# Patient Record
Sex: FEMALE | Race: WHITE | NOT HISPANIC OR LATINO | URBAN - NONMETROPOLITAN AREA
[De-identification: names, ages, dates, MRNs, and addresses within clinical notes are randomized per-mention and may not be internally consistent; named-entity substitution may affect disease eponyms.]

---

## 2022-02-18 ENCOUNTER — HOSPITAL ENCOUNTER (OUTPATIENT)
Facility: HOSPITAL | Age: 67
Discharge: NURSING, REHAB OR LONG-TERM CARE | End: 2022-03-09
Attending: INTERNAL MEDICINE | Admitting: INTERNAL MEDICINE

## 2022-02-18 ENCOUNTER — APPOINTMENT (OUTPATIENT)
Dept: GENERAL RADIOLOGY | Facility: HOSPITAL | Age: 67
End: 2022-02-18

## 2022-02-18 LAB — GLUCOSE BLDC GLUCOMTR-MCNC: 111 MG/DL (ref 70–130)

## 2022-02-18 PROCEDURE — 87102 FUNGUS ISOLATION CULTURE: CPT | Performed by: INTERNAL MEDICINE

## 2022-02-18 PROCEDURE — 25010000002 HEPARIN (PORCINE) PER 1000 UNITS: Performed by: INTERNAL MEDICINE

## 2022-02-18 PROCEDURE — 71045 X-RAY EXAM CHEST 1 VIEW: CPT

## 2022-02-18 PROCEDURE — 82962 GLUCOSE BLOOD TEST: CPT

## 2022-02-18 PROCEDURE — 99221 1ST HOSP IP/OBS SF/LOW 40: CPT | Performed by: INTERNAL MEDICINE

## 2022-02-18 RX ORDER — HEPARIN SODIUM 5000 [USP'U]/ML
5000 INJECTION, SOLUTION INTRAVENOUS; SUBCUTANEOUS EVERY 12 HOURS SCHEDULED
Status: DISCONTINUED | OUTPATIENT
Start: 2022-02-18 | End: 2022-03-10 | Stop reason: HOSPADM

## 2022-02-18 RX ORDER — IPRATROPIUM BROMIDE AND ALBUTEROL SULFATE 2.5; .5 MG/3ML; MG/3ML
3 SOLUTION RESPIRATORY (INHALATION)
Status: DISCONTINUED | OUTPATIENT
Start: 2022-02-18 | End: 2022-02-23

## 2022-02-18 RX ORDER — ONDANSETRON 4 MG/1
4 TABLET, FILM COATED ORAL EVERY 6 HOURS PRN
Status: DISCONTINUED | OUTPATIENT
Start: 2022-02-18 | End: 2022-03-10 | Stop reason: HOSPADM

## 2022-02-18 RX ORDER — ACETAMINOPHEN 650 MG/1
650 SUPPOSITORY RECTAL EVERY 4 HOURS PRN
Status: DISCONTINUED | OUTPATIENT
Start: 2022-02-18 | End: 2022-03-10 | Stop reason: HOSPADM

## 2022-02-18 RX ORDER — ACETAMINOPHEN 325 MG/1
650 TABLET ORAL EVERY 4 HOURS PRN
Status: DISCONTINUED | OUTPATIENT
Start: 2022-02-18 | End: 2022-03-10 | Stop reason: HOSPADM

## 2022-02-18 RX ORDER — SODIUM CHLORIDE 0.9 % (FLUSH) 0.9 %
10 SYRINGE (ML) INJECTION EVERY 12 HOURS SCHEDULED
Status: DISCONTINUED | OUTPATIENT
Start: 2022-02-18 | End: 2022-03-10 | Stop reason: HOSPADM

## 2022-02-18 RX ORDER — AMLODIPINE BESYLATE 10 MG/1
10 TABLET ORAL
Status: DISCONTINUED | OUTPATIENT
Start: 2022-02-19 | End: 2022-03-10 | Stop reason: HOSPADM

## 2022-02-18 RX ORDER — POLYETHYLENE GLYCOL 3350 17 G/17G
17 POWDER, FOR SOLUTION ORAL DAILY
Status: DISCONTINUED | OUTPATIENT
Start: 2022-02-19 | End: 2022-03-10 | Stop reason: HOSPADM

## 2022-02-18 RX ORDER — SODIUM CHLORIDE 0.9 % (FLUSH) 0.9 %
10 SYRINGE (ML) INJECTION AS NEEDED
Status: DISCONTINUED | OUTPATIENT
Start: 2022-02-18 | End: 2022-03-10 | Stop reason: HOSPADM

## 2022-02-18 RX ORDER — DEXTROSE MONOHYDRATE 25 G/50ML
25 INJECTION, SOLUTION INTRAVENOUS
Status: DISCONTINUED | OUTPATIENT
Start: 2022-02-18 | End: 2022-03-10 | Stop reason: HOSPADM

## 2022-02-18 RX ORDER — HYDRALAZINE HYDROCHLORIDE 25 MG/1
50 TABLET, FILM COATED ORAL EVERY 12 HOURS SCHEDULED
Status: DISCONTINUED | OUTPATIENT
Start: 2022-02-18 | End: 2022-03-10 | Stop reason: HOSPADM

## 2022-02-18 RX ORDER — LEVETIRACETAM 100 MG/ML
500 SOLUTION ORAL EVERY 12 HOURS SCHEDULED
Status: DISCONTINUED | OUTPATIENT
Start: 2022-02-18 | End: 2022-03-10 | Stop reason: HOSPADM

## 2022-02-18 RX ORDER — ATORVASTATIN CALCIUM 40 MG/1
80 TABLET, FILM COATED ORAL NIGHTLY
Status: DISCONTINUED | OUTPATIENT
Start: 2022-02-18 | End: 2022-03-10 | Stop reason: HOSPADM

## 2022-02-18 RX ORDER — ONDANSETRON 2 MG/ML
4 INJECTION INTRAMUSCULAR; INTRAVENOUS EVERY 6 HOURS PRN
Status: DISCONTINUED | OUTPATIENT
Start: 2022-02-18 | End: 2022-03-10 | Stop reason: HOSPADM

## 2022-02-18 RX ORDER — NICOTINE POLACRILEX 4 MG
15 LOZENGE BUCCAL
Status: DISCONTINUED | OUTPATIENT
Start: 2022-02-18 | End: 2022-03-10 | Stop reason: HOSPADM

## 2022-02-18 RX ORDER — LANSOPRAZOLE
30 KIT DAILY
Status: DISCONTINUED | OUTPATIENT
Start: 2022-02-19 | End: 2022-03-10 | Stop reason: HOSPADM

## 2022-02-19 PROBLEM — J96.01 ACUTE RESPIRATORY FAILURE WITH HYPOXIA: Status: ACTIVE | Noted: 2022-02-19

## 2022-02-19 PROBLEM — Z43.0 ACUTE TRACHEOSTOMY MANAGEMENT: Status: ACTIVE | Noted: 2022-02-19

## 2022-02-19 PROBLEM — G81.94 LEFT HEMIPARESIS: Status: ACTIVE | Noted: 2022-02-19

## 2022-02-19 LAB
ALBUMIN SERPL-MCNC: 4.8 G/DL (ref 3.5–5.2)
ALBUMIN/GLOB SERPL: 1.8 G/DL
ALP SERPL-CCNC: 65 U/L (ref 39–117)
ALT SERPL W P-5'-P-CCNC: 24 U/L (ref 1–33)
ANION GAP SERPL CALCULATED.3IONS-SCNC: 14 MMOL/L (ref 5–15)
AST SERPL-CCNC: 22 U/L (ref 1–32)
BASOPHILS # BLD AUTO: 0.06 10*3/MM3 (ref 0–0.2)
BASOPHILS NFR BLD AUTO: 0.6 % (ref 0–1.5)
BILIRUB SERPL-MCNC: 0.8 MG/DL (ref 0–1.2)
BUN SERPL-MCNC: 31 MG/DL (ref 8–23)
BUN/CREAT SERPL: 56.4 (ref 7–25)
CALCIUM SPEC-SCNC: 11.1 MG/DL (ref 8.6–10.5)
CHLORIDE SERPL-SCNC: 107 MMOL/L (ref 98–107)
CO2 SERPL-SCNC: 24 MMOL/L (ref 22–29)
CREAT SERPL-MCNC: 0.55 MG/DL (ref 0.57–1)
DEPRECATED RDW RBC AUTO: 62.1 FL (ref 37–54)
EOSINOPHIL # BLD AUTO: 0.49 10*3/MM3 (ref 0–0.4)
EOSINOPHIL NFR BLD AUTO: 4.8 % (ref 0.3–6.2)
ERYTHROCYTE [DISTWIDTH] IN BLOOD BY AUTOMATED COUNT: 19 % (ref 12.3–15.4)
GFR SERPL CREATININE-BSD FRML MDRD: 110 ML/MIN/1.73
GLOBULIN UR ELPH-MCNC: 2.6 GM/DL
GLUCOSE BLDC GLUCOMTR-MCNC: 105 MG/DL (ref 70–130)
GLUCOSE BLDC GLUCOMTR-MCNC: 114 MG/DL (ref 70–130)
GLUCOSE BLDC GLUCOMTR-MCNC: 117 MG/DL (ref 70–130)
GLUCOSE BLDC GLUCOMTR-MCNC: 118 MG/DL (ref 70–130)
GLUCOSE BLDC GLUCOMTR-MCNC: 125 MG/DL (ref 70–130)
GLUCOSE SERPL-MCNC: 126 MG/DL (ref 65–99)
HCT VFR BLD AUTO: 30.8 % (ref 34–46.6)
HGB BLD-MCNC: 9.2 G/DL (ref 12–15.9)
IMM GRANULOCYTES # BLD AUTO: 0.09 10*3/MM3 (ref 0–0.05)
IMM GRANULOCYTES NFR BLD AUTO: 0.9 % (ref 0–0.5)
LYMPHOCYTES # BLD AUTO: 1.84 10*3/MM3 (ref 0.7–3.1)
LYMPHOCYTES NFR BLD AUTO: 17.9 % (ref 19.6–45.3)
MCH RBC QN AUTO: 26.8 PG (ref 26.6–33)
MCHC RBC AUTO-ENTMCNC: 29.9 G/DL (ref 31.5–35.7)
MCV RBC AUTO: 89.8 FL (ref 79–97)
MONOCYTES # BLD AUTO: 0.78 10*3/MM3 (ref 0.1–0.9)
MONOCYTES NFR BLD AUTO: 7.6 % (ref 5–12)
NEUTROPHILS NFR BLD AUTO: 68.2 % (ref 42.7–76)
NEUTROPHILS NFR BLD AUTO: 7.04 10*3/MM3 (ref 1.7–7)
NRBC BLD AUTO-RTO: 0 /100 WBC (ref 0–0.2)
PLATELET # BLD AUTO: 428 10*3/MM3 (ref 140–450)
PMV BLD AUTO: 11.5 FL (ref 6–12)
POTASSIUM SERPL-SCNC: 3.8 MMOL/L (ref 3.5–5.2)
PREALB SERPL-MCNC: 17.7 MG/DL (ref 20–40)
PROT SERPL-MCNC: 7.4 G/DL (ref 6–8.5)
RBC # BLD AUTO: 3.43 10*6/MM3 (ref 3.77–5.28)
SODIUM SERPL-SCNC: 145 MMOL/L (ref 136–145)
WBC NRBC COR # BLD: 10.3 10*3/MM3 (ref 3.4–10.8)

## 2022-02-19 PROCEDURE — 97165 OT EVAL LOW COMPLEX 30 MIN: CPT

## 2022-02-19 PROCEDURE — 80053 COMPREHEN METABOLIC PANEL: CPT | Performed by: INTERNAL MEDICINE

## 2022-02-19 PROCEDURE — 25010000002 HEPARIN (PORCINE) PER 1000 UNITS: Performed by: INTERNAL MEDICINE

## 2022-02-19 PROCEDURE — 82962 GLUCOSE BLOOD TEST: CPT

## 2022-02-19 PROCEDURE — 84134 ASSAY OF PREALBUMIN: CPT | Performed by: INTERNAL MEDICINE

## 2022-02-19 PROCEDURE — 99222 1ST HOSP IP/OBS MODERATE 55: CPT | Performed by: OTOLARYNGOLOGY

## 2022-02-19 PROCEDURE — 99233 SBSQ HOSP IP/OBS HIGH 50: CPT | Performed by: INTERNAL MEDICINE

## 2022-02-19 PROCEDURE — 85025 COMPLETE CBC W/AUTO DIFF WBC: CPT | Performed by: INTERNAL MEDICINE

## 2022-02-20 LAB
GLUCOSE BLDC GLUCOMTR-MCNC: 108 MG/DL (ref 70–130)
GLUCOSE BLDC GLUCOMTR-MCNC: 110 MG/DL (ref 70–130)
GLUCOSE BLDC GLUCOMTR-MCNC: 130 MG/DL (ref 70–130)

## 2022-02-20 PROCEDURE — 92610 EVALUATE SWALLOWING FUNCTION: CPT

## 2022-02-20 PROCEDURE — 99233 SBSQ HOSP IP/OBS HIGH 50: CPT | Performed by: INTERNAL MEDICINE

## 2022-02-20 PROCEDURE — 82962 GLUCOSE BLOOD TEST: CPT

## 2022-02-20 PROCEDURE — 25010000002 HEPARIN (PORCINE) PER 1000 UNITS: Performed by: INTERNAL MEDICINE

## 2022-02-20 PROCEDURE — 97163 PT EVAL HIGH COMPLEX 45 MIN: CPT

## 2022-02-20 PROCEDURE — 92597 ORAL SPEECH DEVICE EVAL: CPT

## 2022-02-20 RX ORDER — ZOLPIDEM TARTRATE 5 MG/1
5 TABLET ORAL NIGHTLY PRN
Status: DISPENSED | OUTPATIENT
Start: 2022-02-20 | End: 2022-02-27

## 2022-02-21 LAB
ALBUMIN SERPL-MCNC: 4.6 G/DL (ref 3.5–5.2)
ALBUMIN/GLOB SERPL: 2 G/DL
ALP SERPL-CCNC: 72 U/L (ref 39–117)
ALT SERPL W P-5'-P-CCNC: 21 U/L (ref 1–33)
ANION GAP SERPL CALCULATED.3IONS-SCNC: 10 MMOL/L (ref 5–15)
AST SERPL-CCNC: 22 U/L (ref 1–32)
BASOPHILS # BLD AUTO: 0.03 10*3/MM3 (ref 0–0.2)
BASOPHILS NFR BLD AUTO: 0.3 % (ref 0–1.5)
BILIRUB SERPL-MCNC: 0.6 MG/DL (ref 0–1.2)
BUN SERPL-MCNC: 23 MG/DL (ref 8–23)
BUN/CREAT SERPL: 47.9 (ref 7–25)
CALCIUM SPEC-SCNC: 10.4 MG/DL (ref 8.6–10.5)
CHLORIDE SERPL-SCNC: 104 MMOL/L (ref 98–107)
CO2 SERPL-SCNC: 27 MMOL/L (ref 22–29)
CREAT SERPL-MCNC: 0.48 MG/DL (ref 0.57–1)
CRP SERPL-MCNC: 1.33 MG/DL (ref 0–0.5)
DEPRECATED RDW RBC AUTO: 61.7 FL (ref 37–54)
EOSINOPHIL # BLD AUTO: 0.41 10*3/MM3 (ref 0–0.4)
EOSINOPHIL NFR BLD AUTO: 4.4 % (ref 0.3–6.2)
ERYTHROCYTE [DISTWIDTH] IN BLOOD BY AUTOMATED COUNT: 19.4 % (ref 12.3–15.4)
ERYTHROCYTE [SEDIMENTATION RATE] IN BLOOD: 17 MM/HR (ref 0–30)
GFR SERPL CREATININE-BSD FRML MDRD: 129 ML/MIN/1.73
GLOBULIN UR ELPH-MCNC: 2.3 GM/DL
GLUCOSE BLDC GLUCOMTR-MCNC: 104 MG/DL (ref 70–130)
GLUCOSE BLDC GLUCOMTR-MCNC: 113 MG/DL (ref 70–130)
GLUCOSE BLDC GLUCOMTR-MCNC: 117 MG/DL (ref 70–130)
GLUCOSE BLDC GLUCOMTR-MCNC: 120 MG/DL (ref 70–130)
GLUCOSE SERPL-MCNC: 122 MG/DL (ref 65–99)
HCT VFR BLD AUTO: 31.5 % (ref 34–46.6)
HGB BLD-MCNC: 9.1 G/DL (ref 12–15.9)
IMM GRANULOCYTES # BLD AUTO: 0.08 10*3/MM3 (ref 0–0.05)
IMM GRANULOCYTES NFR BLD AUTO: 0.9 % (ref 0–0.5)
LYMPHOCYTES # BLD AUTO: 1.83 10*3/MM3 (ref 0.7–3.1)
LYMPHOCYTES NFR BLD AUTO: 19.6 % (ref 19.6–45.3)
MCH RBC QN AUTO: 25.6 PG (ref 26.6–33)
MCHC RBC AUTO-ENTMCNC: 28.9 G/DL (ref 31.5–35.7)
MCV RBC AUTO: 88.7 FL (ref 79–97)
MONOCYTES # BLD AUTO: 0.7 10*3/MM3 (ref 0.1–0.9)
MONOCYTES NFR BLD AUTO: 7.5 % (ref 5–12)
NEUTROPHILS NFR BLD AUTO: 6.3 10*3/MM3 (ref 1.7–7)
NEUTROPHILS NFR BLD AUTO: 67.3 % (ref 42.7–76)
NRBC BLD AUTO-RTO: 0 /100 WBC (ref 0–0.2)
PLATELET # BLD AUTO: 402 10*3/MM3 (ref 140–450)
PMV BLD AUTO: 11.7 FL (ref 6–12)
POTASSIUM SERPL-SCNC: 3.6 MMOL/L (ref 3.5–5.2)
PROT SERPL-MCNC: 6.9 G/DL (ref 6–8.5)
RBC # BLD AUTO: 3.55 10*6/MM3 (ref 3.77–5.28)
SODIUM SERPL-SCNC: 141 MMOL/L (ref 136–145)
WBC NRBC COR # BLD: 9.35 10*3/MM3 (ref 3.4–10.8)

## 2022-02-21 PROCEDURE — 97110 THERAPEUTIC EXERCISES: CPT

## 2022-02-21 PROCEDURE — 80053 COMPREHEN METABOLIC PANEL: CPT | Performed by: INTERNAL MEDICINE

## 2022-02-21 PROCEDURE — 82962 GLUCOSE BLOOD TEST: CPT

## 2022-02-21 PROCEDURE — 97530 THERAPEUTIC ACTIVITIES: CPT

## 2022-02-21 PROCEDURE — 99232 SBSQ HOSP IP/OBS MODERATE 35: CPT | Performed by: INTERNAL MEDICINE

## 2022-02-21 PROCEDURE — 99232 SBSQ HOSP IP/OBS MODERATE 35: CPT | Performed by: OTOLARYNGOLOGY

## 2022-02-21 PROCEDURE — 92526 ORAL FUNCTION THERAPY: CPT | Performed by: SPEECH-LANGUAGE PATHOLOGIST

## 2022-02-21 PROCEDURE — 25010000002 HEPARIN (PORCINE) PER 1000 UNITS: Performed by: INTERNAL MEDICINE

## 2022-02-21 PROCEDURE — 86140 C-REACTIVE PROTEIN: CPT | Performed by: INTERNAL MEDICINE

## 2022-02-21 PROCEDURE — 85025 COMPLETE CBC W/AUTO DIFF WBC: CPT | Performed by: INTERNAL MEDICINE

## 2022-02-21 PROCEDURE — 97535 SELF CARE MNGMENT TRAINING: CPT

## 2022-02-21 PROCEDURE — 92507 TX SP LANG VOICE COMM INDIV: CPT | Performed by: SPEECH-LANGUAGE PATHOLOGIST

## 2022-02-21 PROCEDURE — 85652 RBC SED RATE AUTOMATED: CPT | Performed by: INTERNAL MEDICINE

## 2022-02-22 LAB
GLUCOSE BLDC GLUCOMTR-MCNC: 104 MG/DL (ref 70–130)
GLUCOSE BLDC GLUCOMTR-MCNC: 112 MG/DL (ref 70–130)
GLUCOSE BLDC GLUCOMTR-MCNC: 115 MG/DL (ref 70–130)
GLUCOSE BLDC GLUCOMTR-MCNC: 119 MG/DL (ref 70–130)

## 2022-02-22 PROCEDURE — 97535 SELF CARE MNGMENT TRAINING: CPT

## 2022-02-22 PROCEDURE — 25010000002 HEPARIN (PORCINE) PER 1000 UNITS: Performed by: INTERNAL MEDICINE

## 2022-02-22 PROCEDURE — 97110 THERAPEUTIC EXERCISES: CPT

## 2022-02-22 PROCEDURE — 31502 CHANGE OF WINDPIPE AIRWAY: CPT | Performed by: OTOLARYNGOLOGY

## 2022-02-22 PROCEDURE — 82962 GLUCOSE BLOOD TEST: CPT

## 2022-02-22 PROCEDURE — 99232 SBSQ HOSP IP/OBS MODERATE 35: CPT | Performed by: OTOLARYNGOLOGY

## 2022-02-22 PROCEDURE — 31615 TRCHEOBRNCHSC EST TRACHS INC: CPT | Performed by: OTOLARYNGOLOGY

## 2022-02-23 LAB
BACTERIA ISLT: NORMAL
GLUCOSE BLDC GLUCOMTR-MCNC: 107 MG/DL (ref 70–130)
GLUCOSE BLDC GLUCOMTR-MCNC: 108 MG/DL (ref 70–130)
GLUCOSE BLDC GLUCOMTR-MCNC: 113 MG/DL (ref 70–130)
GLUCOSE BLDC GLUCOMTR-MCNC: 115 MG/DL (ref 70–130)
GLUCOSE BLDC GLUCOMTR-MCNC: 121 MG/DL (ref 70–130)

## 2022-02-23 PROCEDURE — 82962 GLUCOSE BLOOD TEST: CPT

## 2022-02-23 PROCEDURE — 97530 THERAPEUTIC ACTIVITIES: CPT

## 2022-02-23 PROCEDURE — 97110 THERAPEUTIC EXERCISES: CPT

## 2022-02-23 PROCEDURE — 31502 CHANGE OF WINDPIPE AIRWAY: CPT | Performed by: OTOLARYNGOLOGY

## 2022-02-23 PROCEDURE — 31615 TRCHEOBRNCHSC EST TRACHS INC: CPT | Performed by: OTOLARYNGOLOGY

## 2022-02-23 PROCEDURE — 99232 SBSQ HOSP IP/OBS MODERATE 35: CPT | Performed by: OTOLARYNGOLOGY

## 2022-02-23 PROCEDURE — 97535 SELF CARE MNGMENT TRAINING: CPT

## 2022-02-23 PROCEDURE — 25010000002 HEPARIN (PORCINE) PER 1000 UNITS: Performed by: INTERNAL MEDICINE

## 2022-02-23 RX ORDER — IPRATROPIUM BROMIDE AND ALBUTEROL SULFATE 2.5; .5 MG/3ML; MG/3ML
3 SOLUTION RESPIRATORY (INHALATION) 4 TIMES DAILY PRN
Status: DISCONTINUED | OUTPATIENT
Start: 2022-02-23 | End: 2022-03-10 | Stop reason: HOSPADM

## 2022-02-24 LAB
GLUCOSE BLDC GLUCOMTR-MCNC: 117 MG/DL (ref 70–130)
GLUCOSE BLDC GLUCOMTR-MCNC: 129 MG/DL (ref 70–130)
GLUCOSE BLDC GLUCOMTR-MCNC: 133 MG/DL (ref 70–130)
GLUCOSE BLDC GLUCOMTR-MCNC: 134 MG/DL (ref 70–130)

## 2022-02-24 PROCEDURE — 97530 THERAPEUTIC ACTIVITIES: CPT

## 2022-02-24 PROCEDURE — 25010000002 HEPARIN (PORCINE) PER 1000 UNITS: Performed by: INTERNAL MEDICINE

## 2022-02-24 PROCEDURE — 82962 GLUCOSE BLOOD TEST: CPT

## 2022-02-24 PROCEDURE — 99232 SBSQ HOSP IP/OBS MODERATE 35: CPT | Performed by: OTOLARYNGOLOGY

## 2022-02-25 LAB
ANION GAP SERPL CALCULATED.3IONS-SCNC: 8 MMOL/L (ref 5–15)
BASOPHILS # BLD AUTO: 0.04 10*3/MM3 (ref 0–0.2)
BASOPHILS NFR BLD AUTO: 0.5 % (ref 0–1.5)
BUN SERPL-MCNC: 20 MG/DL (ref 8–23)
BUN/CREAT SERPL: 45.5 (ref 7–25)
CALCIUM SPEC-SCNC: 10.3 MG/DL (ref 8.6–10.5)
CHLORIDE SERPL-SCNC: 106 MMOL/L (ref 98–107)
CO2 SERPL-SCNC: 29 MMOL/L (ref 22–29)
CREAT SERPL-MCNC: 0.44 MG/DL (ref 0.57–1)
DEPRECATED RDW RBC AUTO: 65 FL (ref 37–54)
EOSINOPHIL # BLD AUTO: 0.37 10*3/MM3 (ref 0–0.4)
EOSINOPHIL NFR BLD AUTO: 4.6 % (ref 0.3–6.2)
ERYTHROCYTE [DISTWIDTH] IN BLOOD BY AUTOMATED COUNT: 19.6 % (ref 12.3–15.4)
GFR SERPL CREATININE-BSD FRML MDRD: 142 ML/MIN/1.73
GLUCOSE BLDC GLUCOMTR-MCNC: 103 MG/DL (ref 70–130)
GLUCOSE BLDC GLUCOMTR-MCNC: 116 MG/DL (ref 70–130)
GLUCOSE BLDC GLUCOMTR-MCNC: 124 MG/DL (ref 70–130)
GLUCOSE BLDC GLUCOMTR-MCNC: 135 MG/DL (ref 70–130)
GLUCOSE SERPL-MCNC: 120 MG/DL (ref 65–99)
HCT VFR BLD AUTO: 29.1 % (ref 34–46.6)
HGB BLD-MCNC: 8.6 G/DL (ref 12–15.9)
IMM GRANULOCYTES # BLD AUTO: 0.04 10*3/MM3 (ref 0–0.05)
IMM GRANULOCYTES NFR BLD AUTO: 0.5 % (ref 0–0.5)
LYMPHOCYTES # BLD AUTO: 1.41 10*3/MM3 (ref 0.7–3.1)
LYMPHOCYTES NFR BLD AUTO: 17.6 % (ref 19.6–45.3)
MCH RBC QN AUTO: 26.6 PG (ref 26.6–33)
MCHC RBC AUTO-ENTMCNC: 29.6 G/DL (ref 31.5–35.7)
MCV RBC AUTO: 90.1 FL (ref 79–97)
MONOCYTES # BLD AUTO: 0.84 10*3/MM3 (ref 0.1–0.9)
MONOCYTES NFR BLD AUTO: 10.5 % (ref 5–12)
NEUTROPHILS NFR BLD AUTO: 5.29 10*3/MM3 (ref 1.7–7)
NEUTROPHILS NFR BLD AUTO: 66.3 % (ref 42.7–76)
NRBC BLD AUTO-RTO: 0 /100 WBC (ref 0–0.2)
PLATELET # BLD AUTO: 385 10*3/MM3 (ref 140–450)
PMV BLD AUTO: 11.4 FL (ref 6–12)
POTASSIUM SERPL-SCNC: 4.4 MMOL/L (ref 3.5–5.2)
RBC # BLD AUTO: 3.23 10*6/MM3 (ref 3.77–5.28)
SODIUM SERPL-SCNC: 143 MMOL/L (ref 136–145)
WBC NRBC COR # BLD: 7.99 10*3/MM3 (ref 3.4–10.8)

## 2022-02-25 PROCEDURE — 80048 BASIC METABOLIC PNL TOTAL CA: CPT | Performed by: INTERNAL MEDICINE

## 2022-02-25 PROCEDURE — 82962 GLUCOSE BLOOD TEST: CPT

## 2022-02-25 PROCEDURE — 97530 THERAPEUTIC ACTIVITIES: CPT

## 2022-02-25 PROCEDURE — 25010000002 HEPARIN (PORCINE) PER 1000 UNITS: Performed by: INTERNAL MEDICINE

## 2022-02-25 PROCEDURE — 85025 COMPLETE CBC W/AUTO DIFF WBC: CPT | Performed by: INTERNAL MEDICINE

## 2022-02-25 PROCEDURE — 92526 ORAL FUNCTION THERAPY: CPT | Performed by: SPEECH-LANGUAGE PATHOLOGIST

## 2022-02-25 PROCEDURE — 97535 SELF CARE MNGMENT TRAINING: CPT

## 2022-02-26 LAB
GLUCOSE BLDC GLUCOMTR-MCNC: 119 MG/DL (ref 70–130)
GLUCOSE BLDC GLUCOMTR-MCNC: 123 MG/DL (ref 70–130)
GLUCOSE BLDC GLUCOMTR-MCNC: 125 MG/DL (ref 70–130)
GLUCOSE BLDC GLUCOMTR-MCNC: 130 MG/DL (ref 70–130)

## 2022-02-26 PROCEDURE — 82962 GLUCOSE BLOOD TEST: CPT

## 2022-02-26 PROCEDURE — 25010000002 HEPARIN (PORCINE) PER 1000 UNITS: Performed by: INTERNAL MEDICINE

## 2022-02-26 PROCEDURE — 97110 THERAPEUTIC EXERCISES: CPT

## 2022-02-27 LAB
GLUCOSE BLDC GLUCOMTR-MCNC: 108 MG/DL (ref 70–130)
GLUCOSE BLDC GLUCOMTR-MCNC: 111 MG/DL (ref 70–130)
GLUCOSE BLDC GLUCOMTR-MCNC: 118 MG/DL (ref 70–130)

## 2022-02-27 PROCEDURE — 25010000002 HEPARIN (PORCINE) PER 1000 UNITS: Performed by: INTERNAL MEDICINE

## 2022-02-27 PROCEDURE — 82962 GLUCOSE BLOOD TEST: CPT

## 2022-02-28 LAB
ANION GAP SERPL CALCULATED.3IONS-SCNC: 10 MMOL/L (ref 5–15)
BASOPHILS # BLD AUTO: 0.02 10*3/MM3 (ref 0–0.2)
BASOPHILS NFR BLD AUTO: 0.2 % (ref 0–1.5)
BUN SERPL-MCNC: 21 MG/DL (ref 8–23)
BUN/CREAT SERPL: 53.8 (ref 7–25)
CALCIUM SPEC-SCNC: 10.2 MG/DL (ref 8.6–10.5)
CHLORIDE SERPL-SCNC: 105 MMOL/L (ref 98–107)
CO2 SERPL-SCNC: 24 MMOL/L (ref 22–29)
CREAT SERPL-MCNC: 0.39 MG/DL (ref 0.57–1)
DEPRECATED RDW RBC AUTO: 61.4 FL (ref 37–54)
EGFRCR SERPLBLD CKD-EPI 2021: 108.6 ML/MIN/1.73
EOSINOPHIL # BLD AUTO: 0.31 10*3/MM3 (ref 0–0.4)
EOSINOPHIL NFR BLD AUTO: 2.9 % (ref 0.3–6.2)
ERYTHROCYTE [DISTWIDTH] IN BLOOD BY AUTOMATED COUNT: 18.9 % (ref 12.3–15.4)
GLUCOSE BLDC GLUCOMTR-MCNC: 112 MG/DL (ref 70–130)
GLUCOSE BLDC GLUCOMTR-MCNC: 116 MG/DL (ref 70–130)
GLUCOSE BLDC GLUCOMTR-MCNC: 117 MG/DL (ref 70–130)
GLUCOSE BLDC GLUCOMTR-MCNC: 125 MG/DL (ref 70–130)
GLUCOSE BLDC GLUCOMTR-MCNC: 126 MG/DL (ref 70–130)
GLUCOSE SERPL-MCNC: 129 MG/DL (ref 65–99)
HCT VFR BLD AUTO: 30.6 % (ref 34–46.6)
HGB BLD-MCNC: 9.1 G/DL (ref 12–15.9)
IMM GRANULOCYTES # BLD AUTO: 0.04 10*3/MM3 (ref 0–0.05)
IMM GRANULOCYTES NFR BLD AUTO: 0.4 % (ref 0–0.5)
LYMPHOCYTES # BLD AUTO: 1.28 10*3/MM3 (ref 0.7–3.1)
LYMPHOCYTES NFR BLD AUTO: 12.1 % (ref 19.6–45.3)
MCH RBC QN AUTO: 26.6 PG (ref 26.6–33)
MCHC RBC AUTO-ENTMCNC: 29.7 G/DL (ref 31.5–35.7)
MCV RBC AUTO: 89.5 FL (ref 79–97)
MONOCYTES # BLD AUTO: 0.95 10*3/MM3 (ref 0.1–0.9)
MONOCYTES NFR BLD AUTO: 9 % (ref 5–12)
NEUTROPHILS NFR BLD AUTO: 7.95 10*3/MM3 (ref 1.7–7)
NEUTROPHILS NFR BLD AUTO: 75.4 % (ref 42.7–76)
NRBC BLD AUTO-RTO: 0 /100 WBC (ref 0–0.2)
PLATELET # BLD AUTO: 349 10*3/MM3 (ref 140–450)
PMV BLD AUTO: 11.6 FL (ref 6–12)
POTASSIUM SERPL-SCNC: 4.3 MMOL/L (ref 3.5–5.2)
RBC # BLD AUTO: 3.42 10*6/MM3 (ref 3.77–5.28)
SODIUM SERPL-SCNC: 139 MMOL/L (ref 136–145)
WBC NRBC COR # BLD: 10.55 10*3/MM3 (ref 3.4–10.8)

## 2022-02-28 PROCEDURE — 82962 GLUCOSE BLOOD TEST: CPT

## 2022-02-28 PROCEDURE — 97110 THERAPEUTIC EXERCISES: CPT

## 2022-02-28 PROCEDURE — 25010000002 HEPARIN (PORCINE) PER 1000 UNITS: Performed by: INTERNAL MEDICINE

## 2022-02-28 PROCEDURE — 92526 ORAL FUNCTION THERAPY: CPT

## 2022-02-28 PROCEDURE — 99231 SBSQ HOSP IP/OBS SF/LOW 25: CPT | Performed by: OTOLARYNGOLOGY

## 2022-02-28 PROCEDURE — 97535 SELF CARE MNGMENT TRAINING: CPT

## 2022-02-28 PROCEDURE — 80048 BASIC METABOLIC PNL TOTAL CA: CPT | Performed by: INTERNAL MEDICINE

## 2022-02-28 PROCEDURE — 85025 COMPLETE CBC W/AUTO DIFF WBC: CPT | Performed by: INTERNAL MEDICINE

## 2022-02-28 RX ORDER — ZOLPIDEM TARTRATE 5 MG/1
5 TABLET ORAL NIGHTLY PRN
Status: DISCONTINUED | OUTPATIENT
Start: 2022-02-28 | End: 2022-03-06

## 2022-03-01 LAB
GLUCOSE BLDC GLUCOMTR-MCNC: 114 MG/DL (ref 70–130)
GLUCOSE BLDC GLUCOMTR-MCNC: 115 MG/DL (ref 70–130)
GLUCOSE BLDC GLUCOMTR-MCNC: 127 MG/DL (ref 70–130)
GLUCOSE BLDC GLUCOMTR-MCNC: 127 MG/DL (ref 70–130)

## 2022-03-01 PROCEDURE — 97110 THERAPEUTIC EXERCISES: CPT

## 2022-03-01 PROCEDURE — 25010000002 HEPARIN (PORCINE) PER 1000 UNITS: Performed by: INTERNAL MEDICINE

## 2022-03-01 PROCEDURE — 82962 GLUCOSE BLOOD TEST: CPT

## 2022-03-01 PROCEDURE — 92526 ORAL FUNCTION THERAPY: CPT

## 2022-03-01 NOTE — PROGRESS NOTES
Aidan Bermudez M.D.  JOSR Muñoz        Internal Medicine Progress Note    3/1/2022   11:08 CST    Name:  Linda Ortiz  MRN:    [id number]     Acct:     [id number]   Room:  [address] [place] [address] Day: 0     Admit Date: 2/18/2022  3:50 PM  PCP: Provider, No Known    Subjective:     C/C: Oxygen weaning and rehabilitation efforts    Interval History: Status: Resting in bed. No family at bedside. Afebrile this am.  Tolerated trach decannulation. 02 sats stable with 02 at 2 lpm.  Alert. Nodding/shaking head seemingly appropriately.Attempting to voice at times. Interacting more with staff.  BS stable.     Review of Systems   Unable to perform ROS: patient nonverbal         Medications:     Allergies: No Known Allergies    Current Meds:   Current Facility-Administered Medications:   •  acetaminophen (TYLENOL) tablet 650 mg, 650 mg, Oral, Q4H PRN **OR** acetaminophen (TYLENOL) suppository 650 mg, 650 mg, Rectal, Q4H PRN, Trevor Bermudez MD  •  amLODIPine (NORVASC) tablet 10 mg, 10 mg, Per G Tube, Q24H, Trevor Bermudez MD  •  atorvastatin (LIPITOR) tablet 80 mg, 80 mg, Per G Tube, Nightly, Trevor Bermudez MD  •  dextrose (D50W) (25 g/50 mL) IV injection 25 g, 25 g, Intravenous, Q15 Min PRN, Trevor Bermudez MD  •  dextrose (GLUTOSE) oral gel 15 g, 15 g, Oral, Q15 Min PRN, Trevor Bermudez MD  •  glucagon (human recombinant) (GLUCAGEN DIAGNOSTIC) injection 1 mg, 1 mg, Intramuscular, Q15 Min PRN, Trevor Bermudez MD  •  heparin (porcine) 5000 UNIT/ML injection 5,000 Units, 5,000 Units, Subcutaneous, Q12H, Trevor Bermudez MD  •  hydrALAZINE (APRESOLINE) tablet 50 mg, 50 mg, Per G Tube, Q12H, Trevor Bermudez MD  •  insulin lispro (humaLOG) injection 2-7 Units, 2-7 Units, Subcutaneous, Q6H, Trevor Bermudez MD  •  ipratropium-albuterol (DUO-NEB) nebulizer solution 3 mL, 3 mL, Nebulization, 4x Daily PRN, Trevor Bermudez MD  •  lansoprazole (FIRST)  "oral suspension 30 mg, 30 mg, Per G Tube, Daily, Trevor Bermudez MD  •  levETIRAcetam (KEPPRA) 100 MG/ML solution 500 mg, 500 mg, Per G Tube, Q12H, Trevor Bermudez MD  •  metoprolol tartrate (LOPRESSOR) tablet 25 mg, 25 mg, Per G Tube, Q12H, Trevor Bermudez MD  •  ondansetron (ZOFRAN) tablet 4 mg, 4 mg, Oral, Q6H PRN **OR** ondansetron (ZOFRAN) injection 4 mg, 4 mg, Intravenous, Q6H PRN, Trevor Bermudez MD  •  polyethylene glycol (MIRALAX) packet 17 g, 17 g, Per G Tube, Daily, Trevor Bermudez MD  •  sennosides (SENOKOT) 8.8 MG/5ML syrup 5 mL, 5 mL, Per G Tube, Daily, Trevor Bermudez MD  •  sodium chloride 0.9 % flush 10 mL, 10 mL, Intravenous, Q12H, Trevor Bermudez MD  •  sodium chloride 0.9 % flush 10 mL, 10 mL, Intravenous, PRN, Trevor Bermudez MD  •  zolpidem (AMBIEN) tablet 5 mg, 5 mg, Oral, Nightly PRN, Trevor Bermudez MD    Data:     Code Status:    There are no questions and answers to display.       No family history on file.    Social History     Socioeconomic History   • Marital status:        Vitals:  Ht 165.1 cm (65\")   Wt 114 kg (251 lb 9.6 oz)   BMI 41.87 kg/m²   T 97.8 HR 94 RR 21 /66 SPO2 98% (2 lpm)          I/O (24Hr):  No intake or output data in the 24 hours ending 03/01/22 1108    Labs and imaging:      Recent Results (from the past 12 hour(s))   POC Glucose Once    Collection Time: 03/01/22  4:46 AM    Specimen: Blood   Result Value Ref Range    Glucose 114 70 - 130 mg/dL             Physical Examination:        Physical Exam  Vitals and nursing note reviewed.   Constitutional:       Appearance: She is obese.   HENT:      Head:      Comments: Incision right scalp     Right Ear: External ear normal.      Left Ear: External ear normal.      Nose: Nose normal.      Mouth/Throat:      Mouth: Mucous membranes are dry.      Pharynx: Oropharynx is clear.   Eyes:      Extraocular Movements: Extraocular movements intact.      " Pupils: Pupils are equal, round, and reactive to light.   Neck:      Comments: Dressing c.d.i  Cardiovascular:      Rate and Rhythm: Normal rate and regular rhythm.      Pulses: Normal pulses.      Heart sounds: Normal heart sounds.   Pulmonary:      Effort: Pulmonary effort is normal.      Breath sounds: Normal breath sounds.   Abdominal:      General: Bowel sounds are normal.      Palpations: Abdomen is soft.      Comments: g tube   Musculoskeletal:      Cervical back: Normal range of motion and neck supple.      Comments: Left side flacid   Skin:     General: Skin is warm and dry.      Capillary Refill: Capillary refill takes 2 to 3 seconds.   Neurological:      Mental Status: She is alert.      Comments: Nonverbal due to trach  Left side weakness   Psychiatric:         Behavior: Behavior normal.           Assessment:             Acute tracheostomy management (HCC)    Acute respiratory failure with hypoxia ([facility])    Left hemiparesis (HCC)    No past medical history on file.     Plan:        1. Right MCA ischemic CVA s/p tPA with hemorrhagic conversion   2. Intraparenchymal right MCA hemorrhage with midline shift s/p right decompressive hemicraniotomy  3. Acute hypoxic respiratory failure s/p COVID 19  4. Dysphagia  5. Dysarthria/Aphasia  6. Multifactorial anemia  7. HTN  8. Obesity     Continue current treatment. Monitor counts. Increase activity. Labs Thursday. Continue nutrition support via AMONs. Maintain patient safety.       Electronically signed by JOSR Vickers on 3/1/2022 at 11:08 CST

## 2022-03-02 LAB
GLUCOSE BLDC GLUCOMTR-MCNC: 104 MG/DL (ref 70–130)
GLUCOSE BLDC GLUCOMTR-MCNC: 113 MG/DL (ref 70–130)
GLUCOSE BLDC GLUCOMTR-MCNC: 121 MG/DL (ref 70–130)

## 2022-03-02 PROCEDURE — 92526 ORAL FUNCTION THERAPY: CPT

## 2022-03-02 PROCEDURE — 25010000002 HEPARIN (PORCINE) PER 1000 UNITS: Performed by: INTERNAL MEDICINE

## 2022-03-02 PROCEDURE — 97110 THERAPEUTIC EXERCISES: CPT

## 2022-03-02 PROCEDURE — 82962 GLUCOSE BLOOD TEST: CPT

## 2022-03-02 PROCEDURE — 97535 SELF CARE MNGMENT TRAINING: CPT

## 2022-03-02 NOTE — PROGRESS NOTES
Aidan Bermudez M.D.  JOSR Muñoz        Internal Medicine Progress Note    3/2/2022   13:25 CST    Name:  Linda Ortiz  MRN:    [id number]     Acct:     [id number]   Room:  [address] [place] [address] Day: 0     Admit Date: 2/18/2022  3:50 PM  PCP: Provider, No Known    Subjective:     C/C: Oxygen weaning and rehabilitation efforts    Interval History: Status: Resting in bed. No family at bedside. Afebrile this am.  Tolerated trach decannulation. 02 sats stable with 02 at 2 lpm.  Asleep.  Rn reports attempting to voice at times. Interacting more with staff.  BS stable.     Review of Systems   Unable to perform ROS: patient nonverbal         Medications:     Allergies: No Known Allergies    Current Meds:   Current Facility-Administered Medications:   •  acetaminophen (TYLENOL) tablet 650 mg, 650 mg, Oral, Q4H PRN **OR** acetaminophen (TYLENOL) suppository 650 mg, 650 mg, Rectal, Q4H PRN, Trevor Bermudez MD  •  amLODIPine (NORVASC) tablet 10 mg, 10 mg, Per G Tube, Q24H, Trevor Bermudez MD  •  atorvastatin (LIPITOR) tablet 80 mg, 80 mg, Per G Tube, Nightly, Trevor Bermudez MD  •  dextrose (D50W) (25 g/50 mL) IV injection 25 g, 25 g, Intravenous, Q15 Min PRN, Trevor Bermudez MD  •  dextrose (GLUTOSE) oral gel 15 g, 15 g, Oral, Q15 Min PRN, Trevor Bermudez MD  •  glucagon (human recombinant) (GLUCAGEN DIAGNOSTIC) injection 1 mg, 1 mg, Intramuscular, Q15 Min PRN, Trevor Bermudez MD  •  heparin (porcine) 5000 UNIT/ML injection 5,000 Units, 5,000 Units, Subcutaneous, Q12H, Trevor Bermudez MD  •  hydrALAZINE (APRESOLINE) tablet 50 mg, 50 mg, Per G Tube, Q12H, Trevor Bermudez MD  •  insulin lispro (humaLOG) injection 2-7 Units, 2-7 Units, Subcutaneous, Q6H, Trevor Bermudez MD  •  ipratropium-albuterol (DUO-NEB) nebulizer solution 3 mL, 3 mL, Nebulization, 4x Daily PRN, Trevor Bermudez MD  •  lansoprazole (FIRST) oral suspension 30 mg, 30 mg,  "Per G Tube, Daily, Trevor Bermudez MD  •  levETIRAcetam (KEPPRA) 100 MG/ML solution 500 mg, 500 mg, Per G Tube, Q12H, Trevor Bermudez MD  •  metoprolol tartrate (LOPRESSOR) tablet 25 mg, 25 mg, Per G Tube, Q12H, Trevor Bermudez MD  •  ondansetron (ZOFRAN) tablet 4 mg, 4 mg, Oral, Q6H PRN **OR** ondansetron (ZOFRAN) injection 4 mg, 4 mg, Intravenous, Q6H PRN, Trevor Bermudez MD  •  polyethylene glycol (MIRALAX) packet 17 g, 17 g, Per G Tube, Daily, Trevor Bermudez MD  •  sennosides (SENOKOT) 8.8 MG/5ML syrup 5 mL, 5 mL, Per G Tube, Daily, Trevor Bermudez MD  •  sodium chloride 0.9 % flush 10 mL, 10 mL, Intravenous, Q12H, Trevor Bermudez MD  •  sodium chloride 0.9 % flush 10 mL, 10 mL, Intravenous, PRN, Trevor Bermudez MD  •  zolpidem (AMBIEN) tablet 5 mg, 5 mg, Oral, Nightly PRN, Trevor Bermudez MD    Data:     Code Status:    There are no questions and answers to display.       No family history on file.    Social History     Socioeconomic History   • Marital status:        Vitals:  Ht 165.1 cm (65\")   Wt 114 kg (251 lb 9.6 oz)   BMI 41.87 kg/m²      T 97.8 HR 84 RR 19 /69 SPO2 100% (2 lpm)          I/O (24Hr):  No intake or output data in the 24 hours ending 03/02/22 1325    Labs and imaging:      Recent Results (from the past 12 hour(s))   POC Glucose Once    Collection Time: 03/02/22  5:09 AM    Specimen: Blood   Result Value Ref Range    Glucose 104 70 - 130 mg/dL   POC Glucose Once    Collection Time: 03/02/22 12:03 PM    Specimen: Blood   Result Value Ref Range    Glucose 121 70 - 130 mg/dL             Physical Examination:        Physical Exam  Vitals and nursing note reviewed.   Constitutional:       Appearance: She is obese.   HENT:      Head:      Comments: Incision right scalp     Right Ear: External ear normal.      Left Ear: External ear normal.      Nose: Nose normal.      Mouth/Throat:      Mouth: Mucous membranes are dry. "      Pharynx: Oropharynx is clear.   Eyes:      Extraocular Movements: Extraocular movements intact.      Pupils: Pupils are equal, round, and reactive to light.   Neck:      Comments: Dressing c.d.i  Cardiovascular:      Rate and Rhythm: Normal rate and regular rhythm.      Pulses: Normal pulses.      Heart sounds: Normal heart sounds.   Pulmonary:      Effort: Pulmonary effort is normal.      Breath sounds: Normal breath sounds.   Abdominal:      General: Bowel sounds are normal.      Palpations: Abdomen is soft.      Comments: g tube   Musculoskeletal:      Cervical back: Normal range of motion and neck supple.      Comments: Left side flacid   Skin:     General: Skin is warm and dry.      Capillary Refill: Capillary refill takes 2 to 3 seconds.   Neurological:      Mental Status: She is alert.      Comments: Nonverbal due to trach  Left side weakness   Psychiatric:         Behavior: Behavior normal.           Assessment:             Acute tracheostomy management (HCC)    Acute respiratory failure with hypoxia (HCC)    Left hemiparesis (HCC)    No past medical history on file.     Plan:        1. Right MCA ischemic CVA s/p tPA with hemorrhagic conversion   2. Intraparenchymal right MCA hemorrhage with midline shift s/p right decompressive hemicraniotomy  3. Acute hypoxic respiratory failure s/p COVID 19  4. Dysphagia  5. Dysarthria/Aphasia  6. Multifactorial anemia  7. HTN  8. Obesity     Continue current treatment. Monitor counts. Increase activity. Labs Thursday. Continue nutrition support via AMONs. Maintain patient safety.       Electronically signed by JOSR Vargas on 3/2/2022 at 13:25 CST     I have discussed the care of Linda Ortiz, including pertinent history and exam findings, with the nurse practitioner.    I have seen and examined the patient and the key elements of all parts of the encounter have been performed by me.  I agree with the assessment, plan and orders as documented byApurva  JOSR Cardoza , after I modified the exam findings and the plan of treatments and the final version is my approved version of the assessment.        Electronically signed by Trevor Bermudez MD on 3/2/2022 at 13:56 CST

## 2022-03-03 VITALS — BODY MASS INDEX: 41.92 KG/M2 | WEIGHT: 251.6 LBS | HEIGHT: 65 IN

## 2022-03-03 LAB
ANION GAP SERPL CALCULATED.3IONS-SCNC: 8 MMOL/L (ref 5–15)
BASOPHILS # BLD AUTO: 0.05 10*3/MM3 (ref 0–0.2)
BASOPHILS NFR BLD AUTO: 0.5 % (ref 0–1.5)
BUN SERPL-MCNC: 21 MG/DL (ref 8–23)
BUN/CREAT SERPL: 55.3 (ref 7–25)
CALCIUM SPEC-SCNC: 10.2 MG/DL (ref 8.6–10.5)
CHLORIDE SERPL-SCNC: 106 MMOL/L (ref 98–107)
CO2 SERPL-SCNC: 26 MMOL/L (ref 22–29)
CREAT SERPL-MCNC: 0.38 MG/DL (ref 0.57–1)
DEPRECATED RDW RBC AUTO: 58 FL (ref 37–54)
EGFRCR SERPLBLD CKD-EPI 2021: 109.3 ML/MIN/1.73
EOSINOPHIL # BLD AUTO: 0.22 10*3/MM3 (ref 0–0.4)
EOSINOPHIL NFR BLD AUTO: 2.3 % (ref 0.3–6.2)
ERYTHROCYTE [DISTWIDTH] IN BLOOD BY AUTOMATED COUNT: 18 % (ref 12.3–15.4)
GLUCOSE BLDC GLUCOMTR-MCNC: 112 MG/DL (ref 70–130)
GLUCOSE BLDC GLUCOMTR-MCNC: 123 MG/DL (ref 70–130)
GLUCOSE BLDC GLUCOMTR-MCNC: 125 MG/DL (ref 70–130)
GLUCOSE BLDC GLUCOMTR-MCNC: 128 MG/DL (ref 70–130)
GLUCOSE BLDC GLUCOMTR-MCNC: 129 MG/DL (ref 70–130)
GLUCOSE SERPL-MCNC: 112 MG/DL (ref 65–99)
HCT VFR BLD AUTO: 30.3 % (ref 34–46.6)
HGB BLD-MCNC: 9.2 G/DL (ref 12–15.9)
IMM GRANULOCYTES # BLD AUTO: 0.02 10*3/MM3 (ref 0–0.05)
IMM GRANULOCYTES NFR BLD AUTO: 0.2 % (ref 0–0.5)
LYMPHOCYTES # BLD AUTO: 1.58 10*3/MM3 (ref 0.7–3.1)
LYMPHOCYTES NFR BLD AUTO: 16.2 % (ref 19.6–45.3)
MCH RBC QN AUTO: 27 PG (ref 26.6–33)
MCHC RBC AUTO-ENTMCNC: 30.4 G/DL (ref 31.5–35.7)
MCV RBC AUTO: 88.9 FL (ref 79–97)
MONOCYTES # BLD AUTO: 0.95 10*3/MM3 (ref 0.1–0.9)
MONOCYTES NFR BLD AUTO: 9.8 % (ref 5–12)
NEUTROPHILS NFR BLD AUTO: 6.92 10*3/MM3 (ref 1.7–7)
NEUTROPHILS NFR BLD AUTO: 71 % (ref 42.7–76)
NRBC BLD AUTO-RTO: 0 /100 WBC (ref 0–0.2)
PLATELET # BLD AUTO: 395 10*3/MM3 (ref 140–450)
PMV BLD AUTO: 11.4 FL (ref 6–12)
POTASSIUM SERPL-SCNC: 4.2 MMOL/L (ref 3.5–5.2)
RBC # BLD AUTO: 3.41 10*6/MM3 (ref 3.77–5.28)
SODIUM SERPL-SCNC: 140 MMOL/L (ref 136–145)
WBC NRBC COR # BLD: 9.74 10*3/MM3 (ref 3.4–10.8)

## 2022-03-03 PROCEDURE — 97168 OT RE-EVAL EST PLAN CARE: CPT | Performed by: OCCUPATIONAL THERAPIST

## 2022-03-03 PROCEDURE — 80048 BASIC METABOLIC PNL TOTAL CA: CPT | Performed by: INTERNAL MEDICINE

## 2022-03-03 PROCEDURE — 92526 ORAL FUNCTION THERAPY: CPT

## 2022-03-03 PROCEDURE — 85025 COMPLETE CBC W/AUTO DIFF WBC: CPT | Performed by: INTERNAL MEDICINE

## 2022-03-03 PROCEDURE — 97110 THERAPEUTIC EXERCISES: CPT

## 2022-03-03 PROCEDURE — 82962 GLUCOSE BLOOD TEST: CPT

## 2022-03-03 PROCEDURE — 25010000002 HEPARIN (PORCINE) PER 1000 UNITS: Performed by: INTERNAL MEDICINE

## 2022-03-03 NOTE — PROGRESS NOTES
Aidan Bermudez M.D.  JOSR Muñoz        Internal Medicine Progress Note    3/3/2022   15:50 CST    Name:  Linda Ortiz  MRN:    [id number]     Acct:     [id number]   Room:  [address] [place] [address] Day: 0     Admit Date: 2/18/2022  3:50 PM  PCP: Provider, No Known    Subjective:     C/C: Oxygen weaning and rehabilitation efforts    Interval History: Status: Resting in bed. No family at bedside. Afebrile.  Continues to do well s/p decannulation. 02 sats stable on room air. Counts stable. Slow progress with therapies. Increased edema to left hand.     Review of Systems   Unable to perform ROS: patient nonverbal         Medications:     Allergies: No Known Allergies    Current Meds:   Current Facility-Administered Medications:   •  acetaminophen (TYLENOL) tablet 650 mg, 650 mg, Oral, Q4H PRN **OR** acetaminophen (TYLENOL) suppository 650 mg, 650 mg, Rectal, Q4H PRN, Trevor Bermudez MD  •  amLODIPine (NORVASC) tablet 10 mg, 10 mg, Per G Tube, Q24H, Trevor Bermudez MD  •  atorvastatin (LIPITOR) tablet 80 mg, 80 mg, Per G Tube, Nightly, Trevor Bermudez MD  •  dextrose (D50W) (25 g/50 mL) IV injection 25 g, 25 g, Intravenous, Q15 Min PRN, Trevor Bermudez MD  •  dextrose (GLUTOSE) oral gel 15 g, 15 g, Oral, Q15 Min PRN, Trevor Bermudez MD  •  glucagon (human recombinant) (GLUCAGEN DIAGNOSTIC) injection 1 mg, 1 mg, Intramuscular, Q15 Min PRN, Trevor Bermudez MD  •  heparin (porcine) 5000 UNIT/ML injection 5,000 Units, 5,000 Units, Subcutaneous, Q12H, Trevor Bermudez MD  •  hydrALAZINE (APRESOLINE) tablet 50 mg, 50 mg, Per G Tube, Q12H, Trevor Bermudez MD  •  insulin lispro (humaLOG) injection 2-7 Units, 2-7 Units, Subcutaneous, Q6H, Trevor Bermudez MD  •  ipratropium-albuterol (DUO-NEB) nebulizer solution 3 mL, 3 mL, Nebulization, 4x Daily PRN, Trevor Bermudez MD  •  lansoprazole (FIRST) oral suspension 30 mg, 30 mg, Per G Tube, Daily,  "Trevor Bermudez MD  •  levETIRAcetam (KEPPRA) 100 MG/ML solution 500 mg, 500 mg, Per G Tube, Q12H, Trevor Bermudez MD  •  metoprolol tartrate (LOPRESSOR) tablet 25 mg, 25 mg, Per G Tube, Q12H, Trevor Bermudez MD  •  ondansetron (ZOFRAN) tablet 4 mg, 4 mg, Oral, Q6H PRN **OR** ondansetron (ZOFRAN) injection 4 mg, 4 mg, Intravenous, Q6H PRN, Trevor Bermudez MD  •  polyethylene glycol (MIRALAX) packet 17 g, 17 g, Per G Tube, Daily, Trevor Bermudez MD  •  sennosides (SENOKOT) 8.8 MG/5ML syrup 5 mL, 5 mL, Per G Tube, Daily, Trevor Bermudez MD  •  sodium chloride 0.9 % flush 10 mL, 10 mL, Intravenous, Q12H, Trevor Bermudez MD  •  sodium chloride 0.9 % flush 10 mL, 10 mL, Intravenous, PRN, Trevor Bermudez MD  •  zolpidem (AMBIEN) tablet 5 mg, 5 mg, Oral, Nightly PRN, Trevor Bermudez MD    Data:     Code Status:    There are no questions and answers to display.       No family history on file.    Social History     Socioeconomic History   • Marital status:        Vitals:  Ht 165.1 cm (65\")   Wt 114 kg (251 lb 9.6 oz)   BMI 41.87 kg/m²      T 97.5 HR 86 RR 18 /62 SPO2 95% (room air)          I/O (24Hr):  No intake or output data in the 24 hours ending 03/03/22 1550    Labs and imaging:      Recent Results (from the past 12 hour(s))   POC Glucose Once    Collection Time: 03/03/22  4:13 AM    Specimen: Blood   Result Value Ref Range    Glucose 112 70 - 130 mg/dL   Basic Metabolic Panel    Collection Time: 03/03/22  4:16 AM    Specimen: Blood   Result Value Ref Range    Glucose 112 (H) 65 - 99 mg/dL    BUN 21 8 - 23 mg/dL    Creatinine 0.38 (L) 0.57 - 1.00 mg/dL    Sodium 140 136 - 145 mmol/L    Potassium 4.2 3.5 - 5.2 mmol/L    Chloride 106 98 - 107 mmol/L    CO2 26.0 22.0 - 29.0 mmol/L    Calcium 10.2 8.6 - 10.5 mg/dL    BUN/Creatinine Ratio 55.3 (H) 7.0 - 25.0    Anion Gap 8.0 5.0 - 15.0 mmol/L    eGFR 109.3 >60.0 mL/min/1.73   CBC Auto " Differential    Collection Time: 03/03/22  4:16 AM    Specimen: Blood   Result Value Ref Range    WBC 9.74 3.40 - 10.80 10*3/mm3    RBC 3.41 (L) 3.77 - 5.28 10*6/mm3    Hemoglobin 9.2 (L) 12.0 - 15.9 g/dL    Hematocrit 30.3 (L) 34.0 - 46.6 %    MCV 88.9 79.0 - 97.0 fL    MCH 27.0 26.6 - 33.0 pg    MCHC 30.4 (L) 31.5 - 35.7 g/dL    RDW 18.0 (H) 12.3 - 15.4 %    RDW-SD 58.0 (H) 37.0 - 54.0 fl    MPV 11.4 6.0 - 12.0 fL    Platelets 395 140 - 450 10*3/mm3    Neutrophil % 71.0 42.7 - 76.0 %    Lymphocyte % 16.2 (L) 19.6 - 45.3 %    Monocyte % 9.8 5.0 - 12.0 %    Eosinophil % 2.3 0.3 - 6.2 %    Basophil % 0.5 0.0 - 1.5 %    Immature Grans % 0.2 0.0 - 0.5 %    Neutrophils, Absolute 6.92 1.70 - 7.00 10*3/mm3    Lymphocytes, Absolute 1.58 0.70 - 3.10 10*3/mm3    Monocytes, Absolute 0.95 (H) 0.10 - 0.90 10*3/mm3    Eosinophils, Absolute 0.22 0.00 - 0.40 10*3/mm3    Basophils, Absolute 0.05 0.00 - 0.20 10*3/mm3    Immature Grans, Absolute 0.02 0.00 - 0.05 10*3/mm3    nRBC 0.0 0.0 - 0.2 /100 WBC   POC Glucose Once    Collection Time: 03/03/22 11:59 AM    Specimen: Blood   Result Value Ref Range    Glucose 129 70 - 130 mg/dL             Physical Examination:        Physical Exam  Vitals and nursing note reviewed.   Constitutional:       Appearance: She is obese.   HENT:      Head:      Comments: Incision right scalp     Right Ear: External ear normal.      Left Ear: External ear normal.      Nose: Nose normal.      Mouth/Throat:      Mouth: Mucous membranes are dry.      Pharynx: Oropharynx is clear.   Eyes:      Extraocular Movements: Extraocular movements intact.      Pupils: Pupils are equal, round, and reactive to light.   Neck:      Comments: Band aid intact  Cardiovascular:      Rate and Rhythm: Normal rate and regular rhythm.      Pulses: Normal pulses.      Heart sounds: Normal heart sounds.   Pulmonary:      Effort: Pulmonary effort is normal.      Breath sounds: Normal breath sounds.   Abdominal:      General: Bowel  sounds are normal.      Palpations: Abdomen is soft.      Comments: g tube   Musculoskeletal:      Cervical back: Normal range of motion and neck supple.      Comments: Left side flacid   Skin:     General: Skin is warm and dry.      Capillary Refill: Capillary refill takes 2 to 3 seconds.   Neurological:      Mental Status: She is alert.      Comments: Nonverbal due to trach  Left side weakness  Nodding/shaking head seemingly appropriately at times   Psychiatric:         Behavior: Behavior normal.           Assessment:             Acute tracheostomy management ([facility])    Acute respiratory failure with hypoxia ([facility])    Left hemiparesis ([facility])    No past medical history on file.     Plan:        1. Right MCA ischemic CVA s/p tPA with hemorrhagic conversion   2. Intraparenchymal right MCA hemorrhage with midline shift s/p right decompressive hemicraniotomy  3. Acute hypoxic respiratory failure s/p COVID 19  4. Dysphagia  5. Dysarthria/Aphasia  6. Multifactorial anemia  7. HTN  8. Obesity     Continue current treatment. Monitor counts. Increase activity. Labs Monday. Continue nutrition support via AMONs. Maintain patient safety. Aggressive therapies as tolerated.       Electronically signed by JOSR Cartwright on 3/3/2022 at 15:50 CST

## 2022-03-03 NOTE — PROGRESS NOTES
"Adult Nutrition  Assessment/PES    Patient Name:  Linda Ortiz  YOB: 1955  MRN: [id number]  Admit Date:  2/18/2022    Assessment Date:  3/3/2022     Reason for Assessment     Row Name 03/03/22 1651          Reason for Assessment    Reason For Assessment follow-up protocol; TF/PN     Diagnosis neurologic conditions; pulmonary disease     Identified At Risk by Screening Criteria tube feeding or parenteral nutrition                Nutrition/Diet History     Row Name 03/03/22 1651          Nutrition/Diet History    Typical Food/Fluid Intake TF tolerated at goal. Minimal progress with SLP. Continue NPO.     Factors Affecting Nutritional Intake impaired cognitive status/motor control; chewing difficulties/inability to chew food; difficulty/impaired swallowing; inability to feed self                Anthropometrics     Row Name 03/03/22 1652 03/03/22 1651       Anthropometrics    Height 165.1 cm (65\") --       Ideal Body Weight (IBW)    Ideal Body Weight (IBW) (kg) 57.29 --       Body Mass Index (BMI)    BMI Assessment -- BMI 40 or greater: obesity grade III               Labs/Tests/Procedures/Meds     Row Name 03/03/22 1652          Labs/Procedures/Meds    Lab Results Reviewed reviewed            Diagnostic Tests/Procedures    Diagnostic Test/Procedure Reviewed reviewed            Medications    Pertinent Medications Reviewed reviewed     Pertinent Medications Comments See MAR                Physical Findings     Row Name 03/03/22 1652          Physical Findings    Overall Physical Appearance obese; on oxygen therapy     Gastrointestinal other (see comments)  BM 3/1     Tubes PEG     Skin poor skin integrity/turgor; non-healing wound(s)                Estimated/Assessed Needs     Row Name 03/03/22 1652          Calculation Measurements    Weight Used For Calculations 56.7 kg (125 lb)  IBW     Height 165.1 cm (65\")            Estimated/Assessed Needs    Additional Documentation Fluid Requirements (Group); " KCAL/KG (Group); Protein Requirements (Group)            KCAL/KG    KCAL/KG 25 Kcal/Kg (kcal)     25 Kcal/Kg (kcal) 1417.5            Protein Requirements    Weight Used For Protein Calculations 56.7 kg (125 lb)  IBW     Est Protein Requirement Amount (gms/kg) 1.4 gm protein     Estimated Protein Requirements (gms/day) 79.38            Fluid Requirements    Fluid Requirements (mL/day) 1710     Estimated Fluid Requirement Method RDA Method     RDA Method (mL) 1710                Nutrition Prescription Ordered     Row Name 03/03/22 1652          Nutrition Prescription PO    Current PO Diet NPO            Nutrition Prescription EN    Enteral Route PEG     Product Peptamen AF (Vital AF 1.2)     Continuous TF Goal Rate (mL/hr) 60 mL/hr     Continuous TF Current Rate (mL/hr) 60 mL/hr     Continuous TF Goal Volume (mL) 1320 mL     Continuous TF Current Volume (mL) 1320 mL     Water flush (mL)  50 mL     Water Flush Frequency Per hour                Evaluation of Received Nutrient/Fluid Intake     Row Name 03/03/22 1653          Nutrient/Fluid Evaluation    Number of Days Evaluated 3 days            Calories Evaluation    Enteral Calories (kcal) 1716     Total Calories (kcal) 1716     Total Calories (kcal/kg) 15.05     % of Kcal Needs 121            Protein Evaluation    Enteral Protein (gm) 109     Total Protein (gm) 109     Total Protein (gm/kg) 0.96     % of Protein Needs 138            Intake Assessment    Energy/Calorie Requirement Assessment exceeds needs     Protein Requirement Assessment exceeds needs     Fluid Requirement Assessment exceeds needs     Average Calorie Intake (days) 3     Average Protein Intake (days) 3     Tolerance tolerating            Fluid Intake Evaluation    Enteral (Free Water) Fluid (mL) 1161     Free Water Flush Fluid (mL) 1405     Total Free Water Intake (mL) 2566 mL            PO Evaluation    % PO Intake NPO            EN Evaluation    Number of Days EN Intake Evaluated 3 days     EN  Average Volume Delivered (mL/day) 1430 mL/day     % Goal Volume  108 %     HOB Greater than or equal to 30 degress                     Problem/Interventions:   Problem 1     Row Name 03/03/22 1654          Nutrition Diagnoses Problem 1    Problem 1 Needs Alternate Route     Etiology (related to) Medical Diagnosis     Neurological CVA; Craniotomy     Signs/Symptoms (evidenced by) NPO; EN Intake Delivery     Percent (%) of EN goal 108 %                      Intervention Goal     Row Name 03/03/22 1654          Intervention Goal    General Meet nutritional needs for age/condition; Disease management/therapy     TF/PN Tolerate TF at goal; Maintain TF/PN; Deliver (%) goal; Deliver estimated need (%)     Deliver % of Goal 100 %     Deliver % of Estimated Need 75 %  greater than     Weight No significant weight loss                Nutrition Intervention     Row Name 03/03/22 1654          Nutrition Intervention    RD/Tech Action Care plan reviewd; Follow Tx progress                Nutrition Prescription     Row Name 03/03/22 1654          Nutrition Prescription EN    Enteral Prescription Enteral begin/change                Education/Evaluation     Row Name 03/03/22 1654          Education    Education No discharge needs identified at this time            Monitor/Evaluation    Monitor Per protocol                 Electronically signed by:  Olimpia Urrutia RD  03/03/22 16:55 CST

## 2022-03-04 LAB
GLUCOSE BLDC GLUCOMTR-MCNC: 108 MG/DL (ref 70–130)
GLUCOSE BLDC GLUCOMTR-MCNC: 109 MG/DL (ref 70–130)
GLUCOSE BLDC GLUCOMTR-MCNC: 111 MG/DL (ref 70–130)
GLUCOSE BLDC GLUCOMTR-MCNC: 135 MG/DL (ref 70–130)

## 2022-03-04 PROCEDURE — 92526 ORAL FUNCTION THERAPY: CPT

## 2022-03-04 PROCEDURE — 82962 GLUCOSE BLOOD TEST: CPT

## 2022-03-04 PROCEDURE — 25010000002 HEPARIN (PORCINE) PER 1000 UNITS: Performed by: INTERNAL MEDICINE

## 2022-03-04 PROCEDURE — 97110 THERAPEUTIC EXERCISES: CPT

## 2022-03-04 PROCEDURE — 97535 SELF CARE MNGMENT TRAINING: CPT | Performed by: OCCUPATIONAL THERAPIST

## 2022-03-04 PROCEDURE — 97530 THERAPEUTIC ACTIVITIES: CPT | Performed by: OCCUPATIONAL THERAPIST

## 2022-03-04 NOTE — PROGRESS NOTES
Aidan Bermudez M.D.  JOSR Muñoz        Internal Medicine Progress Note    3/4/2022   14:19 CST    Name:  Linda Ortiz  MRN:    [id number]     Acct:     [id number]   Room:  [address] [place] [address] Day: 0     Admit Date: 2/18/2022  3:50 PM  PCP: Provider, No Known    Subjective:     C/C: Oxygen weaning and rehabilitation efforts    Interval History: Status: Resting in bed. No family at bedside. Afebrile.  Continues to do well s/p decannulation. 02 sats stable on room air. Counts stable. Slow progress with therapies. Increased edema to left hand.     Review of Systems   Unable to perform ROS: patient nonverbal         Medications:     Allergies: No Known Allergies    Current Meds:   Current Facility-Administered Medications:   •  acetaminophen (TYLENOL) tablet 650 mg, 650 mg, Oral, Q4H PRN **OR** acetaminophen (TYLENOL) suppository 650 mg, 650 mg, Rectal, Q4H PRN, Trevor Bermudez MD  •  amLODIPine (NORVASC) tablet 10 mg, 10 mg, Per G Tube, Q24H, Trevor Bermudez MD  •  atorvastatin (LIPITOR) tablet 80 mg, 80 mg, Per G Tube, Nightly, Trevor Bermudez MD  •  dextrose (D50W) (25 g/50 mL) IV injection 25 g, 25 g, Intravenous, Q15 Min PRN, Trevor Bermudez MD  •  dextrose (GLUTOSE) oral gel 15 g, 15 g, Oral, Q15 Min PRN, Trevor Bermudez MD  •  glucagon (human recombinant) (GLUCAGEN DIAGNOSTIC) injection 1 mg, 1 mg, Intramuscular, Q15 Min PRN, Trevor Bermudez MD  •  heparin (porcine) 5000 UNIT/ML injection 5,000 Units, 5,000 Units, Subcutaneous, Q12H, Trevor Bermudez MD  •  hydrALAZINE (APRESOLINE) tablet 50 mg, 50 mg, Per G Tube, Q12H, Trevor Bermudez MD  •  insulin lispro (humaLOG) injection 2-7 Units, 2-7 Units, Subcutaneous, Q6H, Trevor Bermudez MD  •  ipratropium-albuterol (DUO-NEB) nebulizer solution 3 mL, 3 mL, Nebulization, 4x Daily PRN, Trevor Bermudez MD  •  lansoprazole (FIRST) oral suspension 30 mg, 30 mg, Per G Tube, Daily,  "Trevor Bermudez MD  •  levETIRAcetam (KEPPRA) 100 MG/ML solution 500 mg, 500 mg, Per G Tube, Q12H, Trevor Bermudez MD  •  metoprolol tartrate (LOPRESSOR) tablet 25 mg, 25 mg, Per G Tube, Q12H, Trevor Bermudez MD  •  ondansetron (ZOFRAN) tablet 4 mg, 4 mg, Oral, Q6H PRN **OR** ondansetron (ZOFRAN) injection 4 mg, 4 mg, Intravenous, Q6H PRN, Trevor Bermudez MD  •  polyethylene glycol (MIRALAX) packet 17 g, 17 g, Per G Tube, Daily, Trevor Bermudez MD  •  sennosides (SENOKOT) 8.8 MG/5ML syrup 5 mL, 5 mL, Per G Tube, Daily, Trevor Bermudez MD  •  sodium chloride 0.9 % flush 10 mL, 10 mL, Intravenous, Q12H, Trevor Bermudez MD  •  sodium chloride 0.9 % flush 10 mL, 10 mL, Intravenous, PRN, Trevor Bermudez MD  •  zolpidem (AMBIEN) tablet 5 mg, 5 mg, Oral, Nightly PRN, Trevor Bermudez MD    Data:     Code Status:    There are no questions and answers to display.       No family history on file.    Social History     Socioeconomic History   • Marital status:        Vitals:  Ht 165.1 cm (65\")   Wt 114 kg (251 lb 9.6 oz)   BMI 41.87 kg/m²      T 97.8 HR 83 RR 20 /55 SPO2 100% (room air)          I/O (24Hr):  No intake or output data in the 24 hours ending 03/04/22 1419    Labs and imaging:      Recent Results (from the past 12 hour(s))   POC Glucose Once    Collection Time: 03/04/22  5:07 AM    Specimen: Blood   Result Value Ref Range    Glucose 108 70 - 130 mg/dL   POC Glucose Once    Collection Time: 03/04/22 12:15 PM    Specimen: Blood   Result Value Ref Range    Glucose 135 (H) 70 - 130 mg/dL             Physical Examination:        Physical Exam  Vitals and nursing note reviewed.   Constitutional:       Appearance: She is obese.   HENT:      Head:      Comments: Incision right scalp     Right Ear: External ear normal.      Left Ear: External ear normal.      Nose: Nose normal.      Mouth/Throat:      Mouth: Mucous membranes are dry.      Pharynx: " Oropharynx is clear.   Eyes:      Extraocular Movements: Extraocular movements intact.      Pupils: Pupils are equal, round, and reactive to light.   Neck:      Comments: Band aid intact  Cardiovascular:      Rate and Rhythm: Normal rate and regular rhythm.      Pulses: Normal pulses.      Heart sounds: Normal heart sounds.   Pulmonary:      Effort: Pulmonary effort is normal.      Breath sounds: Normal breath sounds.   Abdominal:      General: Bowel sounds are normal.      Palpations: Abdomen is soft.      Comments: g tube   Musculoskeletal:      Cervical back: Normal range of motion and neck supple.      Comments: Left hemiparesis   Skin:     General: Skin is warm and dry.      Capillary Refill: Capillary refill takes 2 to 3 seconds.   Neurological:      Mental Status: She is alert.      Comments: Nonverbal due to trach  Left side weakness  Nodding/shaking head seemingly appropriately at times   Psychiatric:         Behavior: Behavior normal.           Assessment:             Acute tracheostomy management (HCC)    Acute respiratory failure with hypoxia (HCC)    Left hemiparesis (HCC)    No past medical history on file.     Plan:        1. Right MCA ischemic CVA s/p tPA with hemorrhagic conversion   2. Intraparenchymal right MCA hemorrhage with midline shift s/p right decompressive hemicraniotomy  3. Acute hypoxic respiratory failure s/p COVID 19  4. Dysphagia  5. Dysarthria/Aphasia  6. Multifactorial anemia  7. HTN  8. Obesity     Continue current treatment. Monitor counts. Increase activity. Labs Monday. Continue nutrition support via AMONs. Maintain patient safety. Aggressive therapies as tolerated.       Electronically signed by JOSR Cartwright on 3/4/2022 at 14:19 CST

## 2022-03-05 LAB
GLUCOSE BLDC GLUCOMTR-MCNC: 113 MG/DL (ref 70–130)
GLUCOSE BLDC GLUCOMTR-MCNC: 124 MG/DL (ref 70–130)
GLUCOSE BLDC GLUCOMTR-MCNC: 133 MG/DL (ref 70–130)
GLUCOSE BLDC GLUCOMTR-MCNC: 95 MG/DL (ref 70–130)

## 2022-03-05 PROCEDURE — 25010000002 HEPARIN (PORCINE) PER 1000 UNITS: Performed by: INTERNAL MEDICINE

## 2022-03-05 PROCEDURE — 82962 GLUCOSE BLOOD TEST: CPT

## 2022-03-05 NOTE — PROGRESS NOTES
Aidan Bermudez M.D.  JOSR Muñoz        Internal Medicine Progress Note    3/5/2022   13:47 CST    Name:  Linda Ortiz  MRN:    [id number]     Acct:     [id number]   Room:  [address] [place] [address] Day: 0     Admit Date: 2/18/2022  3:50 PM  PCP: Provider, No Known    Subjective:     C/C: Oxygen weaning and rehabilitation efforts    Interval History: Status: Resting in bed. No family at bedside. Afebrile.  Continues to do well s/p decannulation. 02 sats stable on room air. Counts stable. Slow progress with therapies. Increased edema to left hand.     Review of Systems   Unable to perform ROS: patient nonverbal         Medications:     Allergies: No Known Allergies    Current Meds:   Current Facility-Administered Medications:   •  acetaminophen (TYLENOL) tablet 650 mg, 650 mg, Oral, Q4H PRN **OR** acetaminophen (TYLENOL) suppository 650 mg, 650 mg, Rectal, Q4H PRN, Trevor Bermudez MD  •  amLODIPine (NORVASC) tablet 10 mg, 10 mg, Per G Tube, Q24H, Trevor Bermudez MD  •  atorvastatin (LIPITOR) tablet 80 mg, 80 mg, Per G Tube, Nightly, Trevor Bermudez MD  •  dextrose (D50W) (25 g/50 mL) IV injection 25 g, 25 g, Intravenous, Q15 Min PRN, Trevor Bermudez MD  •  dextrose (GLUTOSE) oral gel 15 g, 15 g, Oral, Q15 Min PRN, Trevor Bermudez MD  •  glucagon (human recombinant) (GLUCAGEN DIAGNOSTIC) injection 1 mg, 1 mg, Intramuscular, Q15 Min PRN, Trevor Bermudez MD  •  heparin (porcine) 5000 UNIT/ML injection 5,000 Units, 5,000 Units, Subcutaneous, Q12H, Trevor Bermudez MD  •  hydrALAZINE (APRESOLINE) tablet 50 mg, 50 mg, Per G Tube, Q12H, Trevor Bermudez MD  •  insulin lispro (humaLOG) injection 2-7 Units, 2-7 Units, Subcutaneous, Q6H, Trevor Bermudez MD  •  ipratropium-albuterol (DUO-NEB) nebulizer solution 3 mL, 3 mL, Nebulization, 4x Daily PRN, Trevor Bermudez MD  •  lansoprazole (FIRST) oral suspension 30 mg, 30 mg, Per G Tube, Daily,  "Trevor Bermudez MD  •  levETIRAcetam (KEPPRA) 100 MG/ML solution 500 mg, 500 mg, Per G Tube, Q12H, Trevor Bermudez MD  •  metoprolol tartrate (LOPRESSOR) tablet 25 mg, 25 mg, Per G Tube, Q12H, Trevor Bermudez MD  •  ondansetron (ZOFRAN) tablet 4 mg, 4 mg, Oral, Q6H PRN **OR** ondansetron (ZOFRAN) injection 4 mg, 4 mg, Intravenous, Q6H PRN, Trevor Bermudez MD  •  polyethylene glycol (MIRALAX) packet 17 g, 17 g, Per G Tube, Daily, Trevor Bermudez MD  •  sennosides (SENOKOT) 8.8 MG/5ML syrup 5 mL, 5 mL, Per G Tube, Daily, Trevor Bermudez MD  •  sodium chloride 0.9 % flush 10 mL, 10 mL, Intravenous, Q12H, Trevor Bermudez MD  •  sodium chloride 0.9 % flush 10 mL, 10 mL, Intravenous, PRN, Trevor Bermudez MD  •  zolpidem (AMBIEN) tablet 5 mg, 5 mg, Oral, Nightly PRN, Trevor Bermudez MD    Data:     Code Status:    There are no questions and answers to display.       No family history on file.    Social History     Socioeconomic History   • Marital status:        Vitals:  Ht 165.1 cm (65\")   Wt 114 kg (251 lb 9.6 oz)   BMI 41.87 kg/m²      T 97.8 HR 85 RR 22 /78 SPO2 93% (room air)          I/O (24Hr):  No intake or output data in the 24 hours ending 03/05/22 1347    Labs and imaging:      Recent Results (from the past 12 hour(s))   POC Glucose Once    Collection Time: 03/05/22  5:29 AM    Specimen: Blood   Result Value Ref Range    Glucose 113 70 - 130 mg/dL   POC Glucose Once    Collection Time: 03/05/22 12:15 PM    Specimen: Blood   Result Value Ref Range    Glucose 133 (H) 70 - 130 mg/dL             Physical Examination:        Physical Exam  Vitals and nursing note reviewed.   Constitutional:       Appearance: She is obese.   HENT:      Head:      Comments: Incision right scalp     Right Ear: External ear normal.      Left Ear: External ear normal.      Nose: Nose normal.      Mouth/Throat:      Mouth: Mucous membranes are dry.      Pharynx: " Oropharynx is clear.   Eyes:      Extraocular Movements: Extraocular movements intact.      Pupils: Pupils are equal, round, and reactive to light.   Neck:      Comments: Band aid intact  Cardiovascular:      Rate and Rhythm: Normal rate and regular rhythm.      Pulses: Normal pulses.      Heart sounds: Normal heart sounds.   Pulmonary:      Effort: Pulmonary effort is normal.      Breath sounds: Normal breath sounds.   Abdominal:      General: Bowel sounds are normal.      Palpations: Abdomen is soft.      Comments: g tube   Musculoskeletal:      Cervical back: Normal range of motion and neck supple.      Comments: Left hemiparesis   Skin:     General: Skin is warm and dry.      Capillary Refill: Capillary refill takes 2 to 3 seconds.   Neurological:      Mental Status: She is alert.      Comments: Nonverbal due to trach  Left side weakness  Eyes open but would not follow command   Psychiatric:         Behavior: Behavior normal.           Assessment:             Acute tracheostomy management (HCC)    Acute respiratory failure with hypoxia (HCC)    Left hemiparesis (HCC)    No past medical history on file.     Plan:        1. Right MCA ischemic CVA s/p tPA with hemorrhagic conversion   2. Intraparenchymal right MCA hemorrhage with midline shift s/p right decompressive hemicraniotomy  3. Acute hypoxic respiratory failure s/p COVID 19  4. Dysphagia  5. Dysarthria/Aphasia  6. Multifactorial anemia  7. HTN  8. Obesity     Continue current treatment. Monitor counts. Increase activity. Labs Monday. Continue nutrition support via AMONs. Maintain patient safety. Aggressive therapies as tolerated.       Electronically signed by Trevor Bermudez MD on 3/5/2022 at 13:47 CST

## 2022-03-06 LAB
GLUCOSE BLDC GLUCOMTR-MCNC: 108 MG/DL (ref 70–130)
GLUCOSE BLDC GLUCOMTR-MCNC: 113 MG/DL (ref 70–130)
GLUCOSE BLDC GLUCOMTR-MCNC: 120 MG/DL (ref 70–130)
GLUCOSE BLDC GLUCOMTR-MCNC: 138 MG/DL (ref 70–130)

## 2022-03-06 PROCEDURE — 82962 GLUCOSE BLOOD TEST: CPT

## 2022-03-06 PROCEDURE — 25010000002 HEPARIN (PORCINE) PER 1000 UNITS: Performed by: INTERNAL MEDICINE

## 2022-03-06 PROCEDURE — 97110 THERAPEUTIC EXERCISES: CPT

## 2022-03-06 PROCEDURE — 63710000001 ONDANSETRON PER 8 MG: Performed by: INTERNAL MEDICINE

## 2022-03-06 RX ORDER — ZOLPIDEM TARTRATE 5 MG/1
5 TABLET ORAL NIGHTLY PRN
Status: DISPENSED | OUTPATIENT
Start: 2022-03-06 | End: 2022-03-07

## 2022-03-06 NOTE — PROGRESS NOTES
Aidan Bermudez M.D.  JOSR Muñoz        Internal Medicine Progress Note    3/6/2022   10:50 CST    Name:  Linda Ortiz  MRN:    [id number]     Acct:     [id number]   Room:  [address] [place] [address] Day: 0     Admit Date: 2/18/2022  3:50 PM  PCP: Provider, No Known    Subjective:     C/C: Oxygen weaning and rehabilitation efforts    Interval History: Status: Resting in bed. No family at bedside. Afebrile.  Continues to do well s/p decannulation. 02 sats stable on room air. Counts stable. Slow progress with therapies. Increased edema to left hand.     Review of Systems   Unable to perform ROS: patient nonverbal         Medications:     Allergies: No Known Allergies    Current Meds:   Current Facility-Administered Medications:   •  acetaminophen (TYLENOL) tablet 650 mg, 650 mg, Oral, Q4H PRN **OR** acetaminophen (TYLENOL) suppository 650 mg, 650 mg, Rectal, Q4H PRN, Trevor Bermudez MD  •  amLODIPine (NORVASC) tablet 10 mg, 10 mg, Per G Tube, Q24H, Trevor Bermudez MD  •  atorvastatin (LIPITOR) tablet 80 mg, 80 mg, Per G Tube, Nightly, Trevor Bermudez MD  •  dextrose (D50W) (25 g/50 mL) IV injection 25 g, 25 g, Intravenous, Q15 Min PRN, Trevor Bermudez MD  •  dextrose (GLUTOSE) oral gel 15 g, 15 g, Oral, Q15 Min PRN, Trevor Bermudez MD  •  glucagon (human recombinant) (GLUCAGEN DIAGNOSTIC) injection 1 mg, 1 mg, Intramuscular, Q15 Min PRN, Trevor Bermudez MD  •  heparin (porcine) 5000 UNIT/ML injection 5,000 Units, 5,000 Units, Subcutaneous, Q12H, Trevor Bermudez MD  •  hydrALAZINE (APRESOLINE) tablet 50 mg, 50 mg, Per G Tube, Q12H, Trevor Bermudez MD  •  insulin lispro (humaLOG) injection 2-7 Units, 2-7 Units, Subcutaneous, Q6H, Trevor Bermudez MD  •  ipratropium-albuterol (DUO-NEB) nebulizer solution 3 mL, 3 mL, Nebulization, 4x Daily PRN, Trevor Bermudez MD  •  lansoprazole (FIRST) oral suspension 30 mg, 30 mg, Per G Tube, Daily,  "Trevor Bermudez MD  •  levETIRAcetam (KEPPRA) 100 MG/ML solution 500 mg, 500 mg, Per G Tube, Q12H, Trevor Bermudez MD  •  metoprolol tartrate (LOPRESSOR) tablet 25 mg, 25 mg, Per G Tube, Q12H, Trevor Bermudez MD  •  ondansetron (ZOFRAN) tablet 4 mg, 4 mg, Oral, Q6H PRN **OR** ondansetron (ZOFRAN) injection 4 mg, 4 mg, Intravenous, Q6H PRN, Trevor Bermudez MD  •  polyethylene glycol (MIRALAX) packet 17 g, 17 g, Per G Tube, Daily, Trevor Bermudez MD  •  sennosides (SENOKOT) 8.8 MG/5ML syrup 5 mL, 5 mL, Per G Tube, Daily, Trevor Bermudez MD  •  sodium chloride 0.9 % flush 10 mL, 10 mL, Intravenous, Q12H, Trevor Bermudez MD  •  sodium chloride 0.9 % flush 10 mL, 10 mL, Intravenous, PRN, Trevor Bermudez MD  •  zolpidem (AMBIEN) tablet 5 mg, 5 mg, Per G Tube, Nightly PRN, Trevor Bermudez MD    Data:     Code Status:    There are no questions and answers to display.       No family history on file.    Social History     Socioeconomic History   • Marital status:        Vitals:  Ht 165.1 cm (65\")   Wt 114 kg (251 lb 9.6 oz)   BMI 41.87 kg/m²      T 97.6 HR 88 RR 23 /58 SPO2 93% (room air)          I/O (24Hr):  No intake or output data in the 24 hours ending 03/06/22 1050    Labs and imaging:      Recent Results (from the past 12 hour(s))   POC Glucose Once    Collection Time: 03/05/22 11:40 PM    Specimen: Blood   Result Value Ref Range    Glucose 95 70 - 130 mg/dL   POC Glucose Once    Collection Time: 03/06/22  5:08 AM    Specimen: Blood   Result Value Ref Range    Glucose 120 70 - 130 mg/dL             Physical Examination:        Physical Exam  Vitals and nursing note reviewed.   Constitutional:       Appearance: She is obese.   HENT:      Head:      Comments: Incision right scalp     Right Ear: External ear normal.      Left Ear: External ear normal.      Nose: Nose normal.      Mouth/Throat:      Mouth: Mucous membranes are dry.      Pharynx: " Oropharynx is clear.   Eyes:      Extraocular Movements: Extraocular movements intact.      Pupils: Pupils are equal, round, and reactive to light.   Neck:      Comments: Band aid intact  Cardiovascular:      Rate and Rhythm: Normal rate and regular rhythm.      Pulses: Normal pulses.      Heart sounds: Normal heart sounds.   Pulmonary:      Effort: Pulmonary effort is normal.      Breath sounds: Normal breath sounds.   Abdominal:      General: Bowel sounds are normal.      Palpations: Abdomen is soft.      Comments: g tube   Musculoskeletal:      Cervical back: Normal range of motion and neck supple.      Comments: Left hemiparesis   Skin:     General: Skin is warm and dry.      Capillary Refill: Capillary refill takes 2 to 3 seconds.   Neurological:      Mental Status: She is alert.      Comments: Nonverbal due to trach  Left side weakness  Eyes open but would not follow command   Psychiatric:         Behavior: Behavior normal.           Assessment:             Acute tracheostomy management (HCC)    Acute respiratory failure with hypoxia (HCC)    Left hemiparesis (HCC)    No past medical history on file.     Plan:        1. Right MCA ischemic CVA s/p tPA with hemorrhagic conversion   2. Intraparenchymal right MCA hemorrhage with midline shift s/p right decompressive hemicraniotomy  3. Acute hypoxic respiratory failure s/p COVID 19  4. Dysphagia  5. Dysarthria/Aphasia  6. Multifactorial anemia  7. HTN  8. Obesity     Continue current treatment. Monitor counts. Increase activity. Labs Monday. Continue nutrition support via AMONs. Maintain patient safety. Aggressive therapies as tolerated.       Electronically signed by Trevor Bermudez MD on 3/6/2022 at 10:50 CST

## 2022-03-07 LAB
GLUCOSE BLDC GLUCOMTR-MCNC: 105 MG/DL (ref 70–130)
GLUCOSE BLDC GLUCOMTR-MCNC: 107 MG/DL (ref 70–130)
GLUCOSE BLDC GLUCOMTR-MCNC: 114 MG/DL (ref 70–130)
GLUCOSE BLDC GLUCOMTR-MCNC: 130 MG/DL (ref 70–130)

## 2022-03-07 PROCEDURE — 92526 ORAL FUNCTION THERAPY: CPT

## 2022-03-07 PROCEDURE — 63710000001 ONDANSETRON PER 8 MG: Performed by: INTERNAL MEDICINE

## 2022-03-07 PROCEDURE — 97110 THERAPEUTIC EXERCISES: CPT

## 2022-03-07 PROCEDURE — 97535 SELF CARE MNGMENT TRAINING: CPT

## 2022-03-07 PROCEDURE — 97530 THERAPEUTIC ACTIVITIES: CPT

## 2022-03-07 PROCEDURE — 97530 THERAPEUTIC ACTIVITIES: CPT | Performed by: PHYSICAL THERAPIST

## 2022-03-07 PROCEDURE — 25010000002 HEPARIN (PORCINE) PER 1000 UNITS: Performed by: INTERNAL MEDICINE

## 2022-03-07 PROCEDURE — 97164 PT RE-EVAL EST PLAN CARE: CPT | Performed by: PHYSICAL THERAPIST

## 2022-03-07 PROCEDURE — 82962 GLUCOSE BLOOD TEST: CPT

## 2022-03-07 RX ORDER — ZOLPIDEM TARTRATE 5 MG/1
5 TABLET ORAL NIGHTLY PRN
Status: DISPENSED | OUTPATIENT
Start: 2022-03-07 | End: 2022-03-07

## 2022-03-07 NOTE — PROGRESS NOTES
Aidan Bermudez M.D.  JOSR Muñoz        Internal Medicine Progress Note    3/7/2022   15:35 CST    Name:  Linda Ortiz  MRN:    [id number]     Acct:     [id number]   Room:  [address] [place] [address] Day: 0     Admit Date: 2/18/2022  3:50 PM  PCP: Provider, No Known    Subjective:     C/C: Oxygen weaning and rehabilitation efforts    Interval History: Status: Resting in bed. No family at bedside. Afebrile.  Continues to do well s/p decannulation. 02 sats stable on room air. Counts stable. Slow progress with therapies. BS stable. No issues overnight per staff.     Review of Systems   Unable to perform ROS: patient nonverbal         Medications:     Allergies: No Known Allergies    Current Meds:   Current Facility-Administered Medications:   •  acetaminophen (TYLENOL) tablet 650 mg, 650 mg, Oral, Q4H PRN **OR** acetaminophen (TYLENOL) suppository 650 mg, 650 mg, Rectal, Q4H PRN, Trevor Bermudez MD  •  amLODIPine (NORVASC) tablet 10 mg, 10 mg, Per G Tube, Q24H, Trevor Bermudez MD  •  atorvastatin (LIPITOR) tablet 80 mg, 80 mg, Per G Tube, Nightly, Trevor Bermudez MD  •  dextrose (D50W) (25 g/50 mL) IV injection 25 g, 25 g, Intravenous, Q15 Min PRN, Trevor Bermudez MD  •  dextrose (GLUTOSE) oral gel 15 g, 15 g, Oral, Q15 Min PRN, Trevor Bermudez MD  •  glucagon (human recombinant) (GLUCAGEN DIAGNOSTIC) injection 1 mg, 1 mg, Intramuscular, Q15 Min PRN, Trevor Bermudez MD  •  heparin (porcine) 5000 UNIT/ML injection 5,000 Units, 5,000 Units, Subcutaneous, Q12H, Trevor Bermudez MD  •  hydrALAZINE (APRESOLINE) tablet 50 mg, 50 mg, Per G Tube, Q12H, Trevor Bermudez MD  •  insulin lispro (humaLOG) injection 2-7 Units, 2-7 Units, Subcutaneous, Q6H, Trevor Bermudez MD  •  ipratropium-albuterol (DUO-NEB) nebulizer solution 3 mL, 3 mL, Nebulization, 4x Daily PRN, Trevor Bermudez MD  •  lansoprazole (FIRST) oral suspension 30 mg, 30 mg, Per G Tube,  "Daily, Trevor Bermudez MD  •  levETIRAcetam (KEPPRA) 100 MG/ML solution 500 mg, 500 mg, Per G Tube, Q12H, Trevor Bermudez MD  •  metoprolol tartrate (LOPRESSOR) tablet 25 mg, 25 mg, Per G Tube, Q12H, Trevor Bermudez MD  •  ondansetron (ZOFRAN) tablet 4 mg, 4 mg, Oral, Q6H PRN **OR** ondansetron (ZOFRAN) injection 4 mg, 4 mg, Intravenous, Q6H PRN, Trevor Bermudez MD  •  polyethylene glycol (MIRALAX) packet 17 g, 17 g, Per G Tube, Daily, Trevor Bermudez MD  •  sennosides (SENOKOT) 8.8 MG/5ML syrup 5 mL, 5 mL, Per G Tube, Daily, Trevor Bermudez MD  •  sodium chloride 0.9 % flush 10 mL, 10 mL, Intravenous, Q12H, Trevor Bermudez MD  •  sodium chloride 0.9 % flush 10 mL, 10 mL, Intravenous, PRN, Trevor Bermudez MD  •  zolpidem (AMBIEN) tablet 5 mg, 5 mg, Per G Tube, Nightly PRN, Trevor Bermudez MD    Data:     Code Status:    There are no questions and answers to display.       No family history on file.    Social History     Socioeconomic History   • Marital status:        Vitals:  Ht 165.1 cm (65\")   Wt 114 kg (251 lb 9.6 oz)   BMI 41.87 kg/m²      T 98.5 HR 86 RR 18 /52 SPO2 95% (room air)          I/O (24Hr):  No intake or output data in the 24 hours ending 03/07/22 1535    Labs and imaging:      Recent Results (from the past 12 hour(s))   POC Glucose Once    Collection Time: 03/07/22  5:46 AM    Specimen: Blood   Result Value Ref Range    Glucose 114 70 - 130 mg/dL   POC Glucose Once    Collection Time: 03/07/22 12:17 PM    Specimen: Blood   Result Value Ref Range    Glucose 130 70 - 130 mg/dL             Physical Examination:        Physical Exam  Vitals and nursing note reviewed.   Constitutional:       Appearance: She is obese.   HENT:      Head:      Comments: Incision right scalp     Right Ear: External ear normal.      Left Ear: External ear normal.      Nose: Nose normal.      Mouth/Throat:      Mouth: Mucous membranes are dry.      " Pharynx: Oropharynx is clear.   Eyes:      Extraocular Movements: Extraocular movements intact.      Pupils: Pupils are equal, round, and reactive to light.   Neck:      Comments: Band aid intact  Cardiovascular:      Rate and Rhythm: Normal rate and regular rhythm.      Pulses: Normal pulses.      Heart sounds: Normal heart sounds.   Pulmonary:      Effort: Pulmonary effort is normal.      Breath sounds: Normal breath sounds.   Abdominal:      General: Bowel sounds are normal.      Palpations: Abdomen is soft.      Comments: g tube   Musculoskeletal:      Cervical back: Normal range of motion and neck supple.      Comments: Left hemiparesis   Skin:     General: Skin is warm and dry.      Capillary Refill: Capillary refill takes 2 to 3 seconds.   Neurological:      Mental Status: She is alert.      Comments: Nonverbal due to trach  Left side weakness  Nodding/shaking head seemingly appropriately at times   Psychiatric:         Behavior: Behavior normal.           Assessment:             Acute tracheostomy management (HCC)    Acute respiratory failure with hypoxia (HCC)    Left hemiparesis (HCC)    No past medical history on file.     Plan:        1. Right MCA ischemic CVA s/p tPA with hemorrhagic conversion   2. Intraparenchymal right MCA hemorrhage with midline shift s/p right decompressive hemicraniotomy  3. Acute hypoxic respiratory failure s/p COVID 19  4. Dysphagia  5. Dysarthria/Aphasia  6. Multifactorial anemia  7. HTN  8. Obesity     Continue current treatment. Monitor counts. Increase activity. Labs Monday. Continue nutrition support via AMONs. Maintain patient safety. Aggressive therapies as tolerated.       Electronically signed by JOSR Vickers on 3/7/2022 at 15:35 CST   I have discussed the care of Linda Ortiz, including pertinent history and exam findings, with the nurse practitioner.    I have seen and examined the patient and the key elements of all parts of the encounter have been  performed by me.  I agree with the assessment, plan and orders as documented by JOSR Hansen, after I modified the exam findings and the plan of treatments and the final version is my approved version of the assessment.        Electronically signed by Trevor Bermudez MD on 3/7/2022 at 23:27 CST

## 2022-03-08 ENCOUNTER — APPOINTMENT (OUTPATIENT)
Dept: CT IMAGING | Facility: HOSPITAL | Age: 67
End: 2022-03-08

## 2022-03-08 LAB — GLUCOSE BLDC GLUCOMTR-MCNC: 116 MG/DL (ref 70–130)

## 2022-03-08 PROCEDURE — 97530 THERAPEUTIC ACTIVITIES: CPT | Performed by: OCCUPATIONAL THERAPIST

## 2022-03-08 PROCEDURE — 92526 ORAL FUNCTION THERAPY: CPT

## 2022-03-08 PROCEDURE — 25010000002 HEPARIN (PORCINE) PER 1000 UNITS: Performed by: INTERNAL MEDICINE

## 2022-03-08 PROCEDURE — 97535 SELF CARE MNGMENT TRAINING: CPT | Performed by: OCCUPATIONAL THERAPIST

## 2022-03-08 PROCEDURE — 70450 CT HEAD/BRAIN W/O DYE: CPT

## 2022-03-08 PROCEDURE — 82962 GLUCOSE BLOOD TEST: CPT

## 2022-03-08 PROCEDURE — 97110 THERAPEUTIC EXERCISES: CPT

## 2022-03-08 RX ORDER — ZOLPIDEM TARTRATE 5 MG/1
5 TABLET ORAL NIGHTLY PRN
Status: DISCONTINUED | OUTPATIENT
Start: 2022-03-08 | End: 2022-03-10 | Stop reason: HOSPADM

## 2022-03-08 NOTE — PROGRESS NOTES
Aidan Bermudez M.D.  JOSR Muñoz        Internal Medicine Progress Note    3/8/2022   15:11 CST    Name:  Linda Ortiz  MRN:    [id number]     Acct:     [id number]   Room:  [address] [place] [address] Day: 0     Admit Date: 2/18/2022  3:50 PM  PCP: Provider, No Known    Subjective:     C/C: Oxygen weaning and rehabilitation efforts    Interval History: Status: stable. Resting in bed. No family at bedside. Afebrile.  Continues to do well s/p decannulation. 02 sats stable on room air. Returned from CT scan.  Slow progress with therapies. Increased edema to left hand. Discharge planning underway.    Review of Systems   Unable to perform ROS: patient nonverbal         Medications:     Allergies: No Known Allergies    Current Meds:   Current Facility-Administered Medications:   •  acetaminophen (TYLENOL) tablet 650 mg, 650 mg, Oral, Q4H PRN **OR** acetaminophen (TYLENOL) suppository 650 mg, 650 mg, Rectal, Q4H PRN, Trevor Bermudez MD  •  amLODIPine (NORVASC) tablet 10 mg, 10 mg, Per G Tube, Q24H, Trevor Bermudez MD  •  atorvastatin (LIPITOR) tablet 80 mg, 80 mg, Per G Tube, Nightly, Trevor Bermudez MD  •  dextrose (D50W) (25 g/50 mL) IV injection 25 g, 25 g, Intravenous, Q15 Min PRN, Trevor Bermudez MD  •  dextrose (GLUTOSE) oral gel 15 g, 15 g, Oral, Q15 Min PRN, Trevor Bermudez MD  •  glucagon (human recombinant) (GLUCAGEN DIAGNOSTIC) injection 1 mg, 1 mg, Intramuscular, Q15 Min PRN, Trevor Bermudez MD  •  heparin (porcine) 5000 UNIT/ML injection 5,000 Units, 5,000 Units, Subcutaneous, Q12H, Trevor Bermudez MD  •  hydrALAZINE (APRESOLINE) tablet 50 mg, 50 mg, Per G Tube, Q12H, Trevor Bermudez MD  •  ipratropium-albuterol (DUO-NEB) nebulizer solution 3 mL, 3 mL, Nebulization, 4x Daily PRN, Trevor Bermudez MD  •  lansoprazole (FIRST) oral suspension 30 mg, 30 mg, Per G Tube, Daily, Trevor Bermudez MD  •  levETIRAcetam (KEPPRA) 100 MG/ML  "solution 500 mg, 500 mg, Per G Tube, Q12H, Trevor Bermudez MD  •  metoprolol tartrate (LOPRESSOR) tablet 25 mg, 25 mg, Per G Tube, Q12H, Trevor Bermudez MD  •  ondansetron (ZOFRAN) tablet 4 mg, 4 mg, Oral, Q6H PRN **OR** ondansetron (ZOFRAN) injection 4 mg, 4 mg, Intravenous, Q6H PRN, Trevor Bermudez MD  •  polyethylene glycol (MIRALAX) packet 17 g, 17 g, Per G Tube, Daily, Trevor Bermudez MD  •  sennosides (SENOKOT) 8.8 MG/5ML syrup 5 mL, 5 mL, Per G Tube, Daily, Trevor Bermudez MD  •  sodium chloride 0.9 % flush 10 mL, 10 mL, Intravenous, Q12H, Trevor Bermudez MD  •  sodium chloride 0.9 % flush 10 mL, 10 mL, Intravenous, PRN, Trevor Bermudez MD  •  zolpidem (AMBIEN) tablet 5 mg, 5 mg, Per G Tube, Nightly PRN, Trevor Bermudez MD    Data:     Code Status:    There are no questions and answers to display.       No family history on file.    Social History     Socioeconomic History   • Marital status:        Vitals:  Ht 165.1 cm (65\")   Wt 114 kg (251 lb 9.6 oz)   BMI 41.87 kg/m²      T 98.8  RR 18 /59 SPO2 94% (room air)          I/O (24Hr):  No intake or output data in the 24 hours ending 03/08/22 1511    Labs and imaging:      Recent Results (from the past 12 hour(s))   POC Glucose Once    Collection Time: 03/08/22  6:14 AM    Specimen: Blood   Result Value Ref Range    Glucose 116 70 - 130 mg/dL             Physical Examination:        Physical Exam  Vitals and nursing note reviewed.   Constitutional:       Appearance: She is obese.   HENT:      Head:      Comments: Incision right scalp     Right Ear: External ear normal.      Left Ear: External ear normal.      Nose: Nose normal.      Mouth/Throat:      Mouth: Mucous membranes are dry.      Pharynx: Oropharynx is clear.   Eyes:      Extraocular Movements: Extraocular movements intact.      Pupils: Pupils are equal, round, and reactive to light.   Neck:      Comments: Band aid " intact  Cardiovascular:      Rate and Rhythm: Normal rate and regular rhythm.      Pulses: Normal pulses.      Heart sounds: Normal heart sounds.   Pulmonary:      Effort: Pulmonary effort is normal.      Breath sounds: Normal breath sounds.   Abdominal:      General: Bowel sounds are normal.      Palpations: Abdomen is soft.      Comments: g tube   Musculoskeletal:      Cervical back: Normal range of motion and neck supple.      Comments: Left hemiparesis   Skin:     General: Skin is warm and dry.      Capillary Refill: Capillary refill takes 2 to 3 seconds.   Neurological:      Mental Status: She is alert.      Comments: Nonverbal due to trach  Left side weakness  Eyes open but would not follow command   Psychiatric:         Behavior: Behavior normal.           Assessment:             Acute tracheostomy management (HCC)    Acute respiratory failure with hypoxia (HCC)    Left hemiparesis (HCC)    No past medical history on file.     Plan:        1. Right MCA ischemic CVA s/p tPA with hemorrhagic conversion   2. Intraparenchymal right MCA hemorrhage with midline shift s/p right decompressive hemicraniotomy  3. Acute hypoxic respiratory failure s/p COVID 19  4. Dysphagia  5. Dysarthria/Aphasia  6. Multifactorial anemia  7. HTN  8. Obesity     Continue current treatment. Monitor counts. Increase activity. Labs Thursday. Continue nutrition support via AMONs. Maintain patient safety. Aggressive therapies as tolerated. Discharge planning.       Electronically signed by JOSR Cartwright on 3/8/2022 at 15:11 CST

## 2022-03-08 NOTE — PROGRESS NOTES
ACH Fall Risk Assessment Note    Name: Linda Ortiz  MRN: [id number]  [facility]: [id number]  Room: [facility]  Admit Date/Time: 2/18/2022  3:50 PM.    Currently ordered medications associated with an increased risk for fall include:    Scheduled Meds:  amLODIPine, 10 mg, Per G Tube, Q24H  hydrALAZINE, 50 mg, Per G Tube, Q12H  levETIRAcetam, 500 mg, Per G Tube, Q12H  metoprolol tartrate, 25 mg, Per G Tube, Q12H  polyethylene glycol, 17 g, Per G Tube, Daily  sennosides, 5 mL, Per G Tube, Daily    PRN Meds:  •  ondansetron  •  zolpidem    Grayson Mueller, PharmD  03/08/22 15:34 CST

## 2022-03-09 PROCEDURE — 92526 ORAL FUNCTION THERAPY: CPT

## 2022-03-09 PROCEDURE — 25010000002 HEPARIN (PORCINE) PER 1000 UNITS: Performed by: INTERNAL MEDICINE

## 2022-03-09 NOTE — DISCHARGE SUMMARY
Aidan Bermudez M.D.  JOSR Muñoz      Internal Medicine Discharge Summary    Patient ID: Linda Ortiz  MRN: [id number]     Acct:  [id number]       Patient's PCP: Provider, No Known    Admit Date: 2/18/2022     Discharge Date:   3/9/22    Admitting Physician: Trevor Bermudez MD    Discharge Physician: JOSR Cartwright     Active Discharge Diagnoses:  Right MCA ischemic CVA s/p tPA with hemorrhagic conversion   Intraparenchymal right MCA hemorrhage with midline shift s/p right decompressive hemicraniotomy  Acute hypoxic respiratory failure s/p COVID 19  Dysphagia  Dysarthria/Aphasia  Multifactorial anemia  HTN  Obesity      Hospital Problems    Acute tracheostomy management (HCC)    Acute respiratory failure with hypoxia (HCC)    Left hemiparesis (HCC)   No past medical history on file.    The patient was seen and examined on the day of discharge and this discharge summary is in conjunction with any daily progress note from day of discharge.    Code Status:    There are no questions and answers to display.       Hospital Course: Linda Ortiz is a 66 y.o.  female with a past medical history of HTN and obesity who presented to [facility] ED on 1/27/2022 with left sided weakness, lack of sensation on her left side, right sided gaze, slurred speech and ataxia.  TPA was received in the ED and she was admitted to the ICU for continued neurology evaluation and management.  CTA of head and neck revealed right M2 occlusion with perfusion defect in the posterior division of right MCA territory with little core infarction.   was also tested and diagnosed with COVID (of note she has had two Pfizer COVID vaccines 3/5/21 and 3/21/21).  After arrival to the ICU she was intubated for airway protection.  On 1/28/2022 she had a progressive decline in her neurological exam.  CT head revealed right hemorrhagic conversion with 9mm right to left midline shift.   She was taken emergently to the OR for decompressive hemicraniectomy and clot evacuation.  She was able to be liberated from mechanical ventilation on 2/1/22 and required reintubation the next day secondary to her inability to maintain an open airway due to encephalopathy. She underwent trachostomy on 2/9/22 and had peg placed.  She was transferred to our facility for continued oxygen weaning, nutrition support and rehabilitation efforts.    While at our facility, she was seen in consultation by ENT and pulmonology for trach and vent management. She quickly weaned to trach collar, but continued with increased secretions. She underwent flexible bedside laryngoscopy per ENT with trach downsize on 2/22 and was decannulated on 2/23. She was weaned to room air, but had evidence of central sleep apnea with 02 sats in the 60s with sleep. This has been corrected without recurrence with the use of 02 at 2 lpm per nasal cannula with sleep. She attempts to voice at times and seems to nod/shake head appropriately. She continues with dysphagia and requires continuous tube feeding for nutrition support. She has continued left hemiparesis with very limited progress with therapies. Due to missing skull flap on the right, she requires helmet when out of bed. She is now felt stable for discharge to SNF for continued rehabilitation efforts at a lower level of care.     Consults:  Dr. Aragon (ENT)  Dr. Higgins (pulmonology)    Disposition: SNF    Discharged Condition: Stable    Physical Exam  Vitals and nursing note reviewed.   Constitutional:       Appearance: She is obese.   HENT:      Head:      Comments: Incision right scalp     Right Ear: External ear normal.      Left Ear: External ear normal.      Nose: Nose normal.      Mouth/Throat:      Mouth: Mucous membranes are dry.      Pharynx: Oropharynx is clear.   Eyes:      Extraocular Movements: Extraocular movements intact.      Pupils: Pupils are equal, round, and reactive to  light.   Neck:      Comments: Band aid intact  Cardiovascular:      Rate and Rhythm: Normal rate and regular rhythm.      Pulses: Normal pulses.      Heart sounds: Normal heart sounds.   Pulmonary:      Effort: Pulmonary effort is normal.      Breath sounds: Normal breath sounds.   Abdominal:      General: Bowel sounds are normal.      Palpations: Abdomen is soft.      Comments: g tube   Musculoskeletal:      Cervical back: Normal range of motion and neck supple.      Comments: Left hemiparesis   Skin:     General: Skin is warm and dry.      Capillary Refill: Capillary refill takes 2 to 3 seconds.   Neurological:      Mental Status: She is alert.      Comments: Nonverbal due to trach  Left side weakness  Eyes open but would not follow command   Psychiatric:         Behavior: Behavior normal.     Follow Up: Facility physician of record    Diet: NPO Diet NPO Except: Tube Feeding   Peptamen AF at 60 ml/hr    Discharge Medications:   See computer generated medication reconciliation form    Time Spent on discharge is  32 minutes in patient examination, evaluation, patient/family counseling as well as medication reconciliation, prescriptions for required medications, discharge plan and follow up.     Electronically signed by JOSR Cartwright on 3/8/2022 at 21:16 CST     I have discussed the care of Linda Ortiz, including pertinent history and exam findings, with the nurse practitioner.    I have seen and examined the patient and the key elements of all parts of the encounter have been performed by me.  I agree with the assessment, plan and orders as documented by JOSR Muñoz, after I modified the exam findings and the plan of treatments and the final version is my approved version of the assessment.        Electronically signed by Trevor Bermudez MD on 3/9/2022 at 09:21 CST

## 2022-03-10 NOTE — DISCHARGE SUMMARY
ANIA Damon APRN      Internal Medicine Discharge Summary    Patient ID: Linda Ortiz  MRN: [id number]     Acct:  [id number]       Patient's PCP: Provider, No Known    Admit Date: 2/18/2022     Discharge Date:   3/9/22    Admitting Physician: Trevor Bermudez MD    Discharge Physician: Trevor Bermudez MD     Active Discharge Diagnoses:  Right MCA ischemic CVA s/p tPA with hemorrhagic conversion   Intraparenchymal right MCA hemorrhage with midline shift s/p right decompressive hemicraniotomy  Acute hypoxic respiratory failure s/p COVID 19  Dysphagia  Dysarthria/Aphasia  Multifactorial anemia  HTN  Obesity      Hospital Problems    Acute tracheostomy management (HCC)    Acute respiratory failure with hypoxia (HCC)    Left hemiparesis (HCC)   No past medical history on file.    The patient was seen and examined on the day of discharge and this discharge summary is in conjunction with any daily progress note from day of discharge.    Will now dc today.  Code Status:    There are no questions and answers to display.       Hospital Course: Linda Ortiz is a 66 y.o.  female with a past medical history of HTN and obesity who presented to [facility] ED on 1/27/2022 with left sided weakness, lack of sensation on her left side, right sided gaze, slurred speech and ataxia.  TPA was received in the ED and she was admitted to the ICU for continued neurology evaluation and management.  CTA of head and neck revealed right M2 occlusion with perfusion defect in the posterior division of right MCA territory with little core infarction.   was also tested and diagnosed with COVID (of note she has had two Pfizer COVID vaccines 3/5/21 and 3/21/21).  After arrival to the ICU she was intubated for airway protection.  On 1/28/2022 she had a progressive decline in her neurological exam.  CT head revealed right hemorrhagic conversion with 9mm right to  left midline shift.  She was taken emergently to the OR for decompressive hemicraniectomy and clot evacuation.  She was able to be liberated from mechanical ventilation on 2/1/22 and required reintubation the next day secondary to her inability to maintain an open airway due to encephalopathy. She underwent trachostomy on 2/9/22 and had peg placed.  She was transferred to our facility for continued oxygen weaning, nutrition support and rehabilitation efforts.    While at our facility, she was seen in consultation by ENT and pulmonology for trach and vent management. She quickly weaned to trach collar, but continued with increased secretions. She underwent flexible bedside laryngoscopy per ENT with trach downsize on 2/22 and was decannulated on 2/23. She was weaned to room air, but had evidence of central sleep apnea with 02 sats in the 60s with sleep. This has been corrected without recurrence with the use of 02 at 2 lpm per nasal cannula with sleep. She attempts to voice at times and seems to nod/shake head appropriately. She continues with dysphagia and requires continuous tube feeding for nutrition support. She has continued left hemiparesis with very limited progress with therapies. Due to missing skull flap on the right, she requires helmet when out of bed. She is now felt stable for discharge to SNF for continued rehabilitation efforts at a lower level of care.     Consults:  Dr. Aragon (ENT)  Dr. Higgins (pulmonology)    Disposition: SNF    Discharged Condition: Stable    Physical Exam  Vitals and nursing note reviewed.   Constitutional:       Appearance: She is obese.   HENT:      Head:      Comments: Incision right scalp     Right Ear: External ear normal.      Left Ear: External ear normal.      Nose: Nose normal.      Mouth/Throat:      Mouth: Mucous membranes are dry.      Pharynx: Oropharynx is clear.   Eyes:      Extraocular Movements: Extraocular movements intact.      Pupils: Pupils are equal, round,  and reactive to light.   Neck:      Comments: Band aid intact  Cardiovascular:      Rate and Rhythm: Normal rate and regular rhythm.      Pulses: Normal pulses.      Heart sounds: Normal heart sounds.   Pulmonary:      Effort: Pulmonary effort is normal.      Breath sounds: Normal breath sounds.   Abdominal:      General: Bowel sounds are normal.      Palpations: Abdomen is soft.      Comments: g tube   Musculoskeletal:      Cervical back: Normal range of motion and neck supple.      Comments: Left hemiparesis   Skin:     General: Skin is warm and dry.      Capillary Refill: Capillary refill takes 2 to 3 seconds.   Neurological:      Mental Status: She is alert.      Comments: Nonverbal due to trach  Left side weakness  Eyes open but would not follow command   Psychiatric:         Behavior: Behavior normal.     Follow Up: Facility physician of record    Diet: NPO Diet NPO Except: Tube Feeding   Peptamen AF at 60 ml/hr    Discharge Medications:   See computer generated medication reconciliation form    Time Spent on discharge is  32 minutes in patient examination, evaluation, patient/family counseling as well as medication reconciliation, prescriptions for required medications, discharge plan and follow up.     Electronically signed by Trevor Bermudez MD on 3/9/2022 at 21:52 CST